# Patient Record
Sex: FEMALE | Race: ASIAN | NOT HISPANIC OR LATINO | Employment: UNEMPLOYED | URBAN - METROPOLITAN AREA
[De-identification: names, ages, dates, MRNs, and addresses within clinical notes are randomized per-mention and may not be internally consistent; named-entity substitution may affect disease eponyms.]

---

## 2021-04-19 ENCOUNTER — OFFICE VISIT - HEALTHEAST (OUTPATIENT)
Dept: FAMILY MEDICINE | Facility: CLINIC | Age: 61
End: 2021-04-19

## 2021-04-19 DIAGNOSIS — E78.00 HYPERCHOLESTEREMIA: ICD-10-CM

## 2021-04-19 DIAGNOSIS — R03.0 ELEVATED BLOOD PRESSURE READING WITHOUT DIAGNOSIS OF HYPERTENSION: ICD-10-CM

## 2021-04-19 DIAGNOSIS — G44.209 TENSION HEADACHE: ICD-10-CM

## 2021-04-19 RX ORDER — ACETAMINOPHEN 500 MG
1000 TABLET ORAL 3 TIMES DAILY PRN
Qty: 100 TABLET | Refills: 0 | Status: SHIPPED | OUTPATIENT
Start: 2021-04-19

## 2021-06-05 VITALS
SYSTOLIC BLOOD PRESSURE: 158 MMHG | TEMPERATURE: 98 F | RESPIRATION RATE: 16 BRPM | OXYGEN SATURATION: 97 % | WEIGHT: 149.25 LBS | HEART RATE: 73 BPM | DIASTOLIC BLOOD PRESSURE: 84 MMHG

## 2021-06-16 PROBLEM — E78.00 HYPERCHOLESTEREMIA: Status: ACTIVE | Noted: 2021-04-19

## 2021-06-16 NOTE — PATIENT INSTRUCTIONS - HE
Tylenol as needed for pain up to 3 times daily.      Ask at [facility] for info Hmong speaking doctor.  Otherwise, make appointment at  for recheck and new provider on Friday.

## 2021-06-16 NOTE — PROGRESS NOTES
Chief Complaint   Patient presents with     Headache     x3d, throbbing HA, sweating a lot       ASSESSMENT & PLAN:   Diagnoses and all orders for this visit:    Tension headache  -     acetaminophen (TYLENOL EXTRA STRENGTH) 500 MG tablet; Take 2 tablets (1,000 mg total) by mouth 3 (three) times a day as needed for pain.  Dispense: 100 tablet; Refill: 0    Hypercholesteremia    Elevated blood pressure reading without diagnosis of hypertension      Patient with improving left-sided headache.  No red flag symptoms such as neuro deficits, vomiting, markedly elevated blood pressure, weakness, numbness or tingling, thunderclap headache.  Is associated upon exam with left-sided trapezius muscle tenderness.    Tylenol, discussed proper posture and range of motion exercises for arms.    Referred to primary care for Cholesterol labs and re-assess high blood pressure as well as recheck of the headache.      Discussed red flag signs with patient and daughter-in-law.    Supportive care discussed.  See discharge instructions below for specific recommendations given.    At the end of the encounter, I discussed results, diagnosis, medications with the patient and/or caregivers. Discussed red flags for immediate return to clinic/ER, as well as indications for follow up if no improvement. Patient and/or caregiver understood and agreed to plan. Patient was stable for discharge.    25 minutes spent on the date of the encounter doing chart review, review of outside records, patient visit and documentation       SUBJECTIVE    HPI:    JOSEPH Chapin David presents to the walk-in clinic with   Chief Complaint   Patient presents with     Headache     x3d, throbbing HA, sweating a lot       -60 year old Hmong female states that she has had a localized headache on the left side of her head for the last 3 days.  Describes it as a throbbing intermittent pain currently 2-3/10 pain at its worst and decreases down to a 2/10.   Nothing seems to help the  "pain, she has tried Tylenol, but it did not change the pain. States she does use reading glasses but has for years.    Left upper back is tense and tender.  No specific injury.  Does not work.  Stays at home.      Denies any head trauma, seasonal allergies, or any associated symptoms such as dizziness. Denies having this pain before.  Denies migraine hx, fever, sore throat or sinus drainage/pain or exposure to COVID. Denies history of stroke or high blood pressure.     Patient also wondering about her excessive sweating. States that she has to take 2 showers/day and this has been going on since she was 55 years old.     Denies any past medical history other then high cholesterol.  She used to take medication but has stopped it due to insurance coverage.     Pt has not seen a doctor in over 3 years. Her PCP moved to another clinic and she has not been seen for 3 years.      Known exposures: None    See ROS for additional symptoms and/or pertinent negatives.     Due to language barrier, an  was present during the history-taking and subsequent discussion (and for part of the physical exam) with this patient.    History obtained from the patient, daughter in law.      Review of Systems   Constitutional: Negative for chills and fever.   HENT: Negative for congestion, ear pain, sinus pressure, sinus pain, sneezing and sore throat.    Eyes: Positive for visual disturbance (blurry \"normal for my age\" - passed license ).   Gastrointestinal: Negative for vomiting.   Musculoskeletal: Negative for neck pain.       OBJECTIVE    Vitals:    04/19/21 1134   BP: 158/84   Patient Site: Right Arm   Patient Position: Sitting   Cuff Size: Adult Regular   Pulse: 73   Resp: 16   Temp: 98  F (36.7  C)   TempSrc: Oral   SpO2: 97%   Weight: 149 lb 4 oz (67.7 kg)       Physical Exam  Constitutional:       General: She is not in acute distress.     Appearance: She is well-developed.   HENT:      Right Ear: External ear normal.      " Left Ear: External ear normal.      Nose: No congestion or rhinorrhea.   Eyes:      General:         Right eye: No discharge.         Left eye: No discharge.      Conjunctiva/sclera: Conjunctivae normal.      Pupils: Pupils are equal, round, and reactive to light.   Neck:      Musculoskeletal: Normal range of motion. Muscular tenderness present.   Pulmonary:      Effort: Pulmonary effort is normal.   Musculoskeletal: Normal range of motion.         General: Tenderness (left upper trapezius and left para-cervical muscles ) present.   Lymphadenopathy:      Cervical: No cervical adenopathy.   Skin:     General: Skin is warm and dry.      Capillary Refill: Capillary refill takes less than 2 seconds.   Neurological:      General: No focal deficit present.      Mental Status: She is alert and oriented to person, place, and time.      Motor: No weakness (UE ).      Coordination: Coordination normal.      Gait: Gait normal.   Psychiatric:         Mood and Affect: Mood normal.         Behavior: Behavior normal.         Thought Content: Thought content normal.         Judgment: Judgment normal.         Labs/EKG:  No results found for this or any previous visit.        Radiology:      PATIENT INSTRUCTIONS:   Patient Instructions   Tylenol as needed for pain up to 3 times daily.      Ask at [facility] for info Hmong speaking doctor.  Otherwise, make appointment at  for recheck and new provider on Friday.

## 2023-08-31 ENCOUNTER — ANESTHESIA EVENT (OUTPATIENT)
Dept: SURGERY | Facility: HOSPITAL | Age: 63
DRG: 157 | End: 2023-08-31
Payer: MEDICARE

## 2023-08-31 ENCOUNTER — ANESTHESIA (OUTPATIENT)
Dept: SURGERY | Facility: HOSPITAL | Age: 63
DRG: 157 | End: 2023-08-31
Payer: MEDICARE

## 2023-08-31 ENCOUNTER — APPOINTMENT (OUTPATIENT)
Dept: RADIOLOGY | Facility: HOSPITAL | Age: 63
DRG: 157 | End: 2023-08-31
Attending: INTERNAL MEDICINE
Payer: MEDICARE

## 2023-08-31 ENCOUNTER — APPOINTMENT (OUTPATIENT)
Dept: GENERAL RADIOLOGY | Facility: CLINIC | Age: 63
DRG: 137 | End: 2023-08-31
Attending: SURGERY
Payer: MEDICARE

## 2023-08-31 ENCOUNTER — APPOINTMENT (OUTPATIENT)
Dept: CT IMAGING | Facility: HOSPITAL | Age: 63
DRG: 157 | End: 2023-08-31
Attending: STUDENT IN AN ORGANIZED HEALTH CARE EDUCATION/TRAINING PROGRAM
Payer: MEDICARE

## 2023-08-31 ENCOUNTER — HOSPITAL ENCOUNTER (INPATIENT)
Facility: CLINIC | Age: 63
LOS: 6 days | Discharge: HOME | DRG: 137 | End: 2023-09-06
Attending: SURGERY | Admitting: SURGERY
Payer: MEDICARE

## 2023-08-31 ENCOUNTER — HOSPITAL ENCOUNTER (INPATIENT)
Facility: HOSPITAL | Age: 63
LOS: 1 days | Discharge: ANOTHER HOSPITAL | DRG: 157 | End: 2023-08-31
Attending: STUDENT IN AN ORGANIZED HEALTH CARE EDUCATION/TRAINING PROGRAM | Admitting: INTERNAL MEDICINE
Payer: MEDICARE

## 2023-08-31 VITALS
BODY MASS INDEX: 28.4 KG/M2 | OXYGEN SATURATION: 97 % | HEART RATE: 79 BPM | RESPIRATION RATE: 14 BRPM | DIASTOLIC BLOOD PRESSURE: 57 MMHG | WEIGHT: 154.32 LBS | HEIGHT: 62 IN | TEMPERATURE: 100 F | SYSTOLIC BLOOD PRESSURE: 96 MMHG

## 2023-08-31 DIAGNOSIS — L02.11 ABSCESS OF NECK: Primary | ICD-10-CM

## 2023-08-31 DIAGNOSIS — R22.0 JAW SWELLING: ICD-10-CM

## 2023-08-31 DIAGNOSIS — K12.2 LUDWIG'S ANGINA: ICD-10-CM

## 2023-08-31 LAB
ALBUMIN SERPL BCG-MCNC: 4 G/DL (ref 3.5–5.2)
ALP SERPL-CCNC: 99 U/L (ref 35–104)
ALT SERPL W P-5'-P-CCNC: 49 U/L (ref 0–50)
ANION GAP SERPL CALCULATED.3IONS-SCNC: 13 MMOL/L (ref 7–15)
AST SERPL W P-5'-P-CCNC: 28 U/L (ref 0–45)
BASE EXCESS BLDA CALC-SCNC: 0.9 MMOL/L
BASOPHILS # BLD AUTO: 0.1 10E3/UL (ref 0–0.2)
BASOPHILS NFR BLD AUTO: 1 %
BILIRUB SERPL-MCNC: 1.7 MG/DL
BUN SERPL-MCNC: 10.5 MG/DL (ref 8–23)
CALCIUM SERPL-MCNC: 9.6 MG/DL (ref 8.8–10.2)
CHLORIDE SERPL-SCNC: 103 MMOL/L (ref 98–107)
COHGB MFR BLD: 99.8 % (ref 96–97)
CREAT BLD-MCNC: 0.6 MG/DL (ref 0.6–1.1)
CREAT SERPL-MCNC: 0.7 MG/DL (ref 0.51–0.95)
DEPRECATED HCO3 PLAS-SCNC: 26 MMOL/L (ref 22–29)
EOSINOPHIL # BLD AUTO: 0 10E3/UL (ref 0–0.7)
EOSINOPHIL NFR BLD AUTO: 0 %
ERYTHROCYTE [DISTWIDTH] IN BLOOD BY AUTOMATED COUNT: 15 % (ref 10–15)
GFR SERPL CREATININE-BSD FRML MDRD: >60 ML/MIN/1.73M2
GFR SERPL CREATININE-BSD FRML MDRD: >90 ML/MIN/1.73M2
GLUCOSE BLDC GLUCOMTR-MCNC: 107 MG/DL (ref 70–99)
GLUCOSE BLDC GLUCOMTR-MCNC: 136 MG/DL (ref 70–99)
GLUCOSE SERPL-MCNC: 123 MG/DL (ref 70–99)
HCO3 BLD-SCNC: 25 MMOL/L (ref 23–29)
HCT VFR BLD AUTO: 48.9 % (ref 35–47)
HGB BLD-MCNC: 16.6 G/DL (ref 11.7–15.7)
IMM GRANULOCYTES # BLD: 0.1 10E3/UL
IMM GRANULOCYTES NFR BLD: 1 %
LACTATE SERPL-SCNC: 1.2 MMOL/L (ref 0.7–2)
LYMPHOCYTES # BLD AUTO: 0.9 10E3/UL (ref 0.8–5.3)
LYMPHOCYTES NFR BLD AUTO: 5 %
MCH RBC QN AUTO: 31.7 PG (ref 26.5–33)
MCHC RBC AUTO-ENTMCNC: 33.9 G/DL (ref 31.5–36.5)
MCV RBC AUTO: 94 FL (ref 78–100)
MONOCYTES # BLD AUTO: 0.9 10E3/UL (ref 0–1.3)
MONOCYTES NFR BLD AUTO: 5 %
NEUTROPHILS # BLD AUTO: 15.5 10E3/UL (ref 1.6–8.3)
NEUTROPHILS NFR BLD AUTO: 88 %
NRBC # BLD AUTO: 0 10E3/UL
NRBC BLD AUTO-RTO: 0 /100
OXYHGB MFR BLD: >98.5 % (ref 96–97)
PCO2 BLD: 35 MM HG (ref 35–45)
PH BLD: 7.46 [PH] (ref 7.37–7.44)
PLATELET # BLD AUTO: 176 10E3/UL (ref 150–450)
PO2 BLD: 125 MM HG (ref 80–90)
POTASSIUM SERPL-SCNC: 4 MMOL/L (ref 3.4–5.3)
PROT SERPL-MCNC: 7.1 G/DL (ref 6.4–8.3)
RBC # BLD AUTO: 5.23 10E6/UL (ref 3.8–5.2)
SODIUM SERPL-SCNC: 142 MMOL/L (ref 136–145)
TEMPERATURE: 37 DEGREES C
WBC # BLD AUTO: 17.4 10E3/UL (ref 4–11)

## 2023-08-31 PROCEDURE — 0BH17EZ INSERTION OF ENDOTRACHEAL AIRWAY INTO TRACHEA, VIA NATURAL OR ARTIFICIAL OPENING: ICD-10-PCS | Performed by: OTOLARYNGOLOGY

## 2023-08-31 PROCEDURE — 250N000011 HC RX IP 250 OP 636: Mod: JZ | Performed by: NURSE ANESTHETIST, CERTIFIED REGISTERED

## 2023-08-31 PROCEDURE — 87040 BLOOD CULTURE FOR BACTERIA: CPT | Performed by: STUDENT IN AN ORGANIZED HEALTH CARE EDUCATION/TRAINING PROGRAM

## 2023-08-31 PROCEDURE — 200N000002 HC R&B ICU UMMC

## 2023-08-31 PROCEDURE — 74018 RADEX ABDOMEN 1 VIEW: CPT

## 2023-08-31 PROCEDURE — 96375 TX/PRO/DX INJ NEW DRUG ADDON: CPT

## 2023-08-31 PROCEDURE — 250N000011 HC RX IP 250 OP 636: Performed by: NURSE ANESTHETIST, CERTIFIED REGISTERED

## 2023-08-31 PROCEDURE — 250N000013 HC RX MED GY IP 250 OP 250 PS 637: Performed by: INTERNAL MEDICINE

## 2023-08-31 PROCEDURE — 250N000013 HC RX MED GY IP 250 OP 250 PS 637

## 2023-08-31 PROCEDURE — 83605 ASSAY OF LACTIC ACID: CPT | Performed by: STUDENT IN AN ORGANIZED HEALTH CARE EDUCATION/TRAINING PROGRAM

## 2023-08-31 PROCEDURE — 82565 ASSAY OF CREATININE: CPT

## 2023-08-31 PROCEDURE — 250N000011 HC RX IP 250 OP 636: Performed by: INTERNAL MEDICINE

## 2023-08-31 PROCEDURE — G1010 CDSM STANSON: HCPCS

## 2023-08-31 PROCEDURE — 360N000076 HC SURGERY LEVEL 3, PER MIN: Performed by: OTOLARYNGOLOGY

## 2023-08-31 PROCEDURE — 250N000011 HC RX IP 250 OP 636

## 2023-08-31 PROCEDURE — 258N000003 HC RX IP 258 OP 636: Performed by: NURSE ANESTHETIST, CERTIFIED REGISTERED

## 2023-08-31 PROCEDURE — 99291 CRITICAL CARE FIRST HOUR: CPT | Performed by: INTERNAL MEDICINE

## 2023-08-31 PROCEDURE — 999N000157 HC STATISTIC RCP TIME EA 10 MIN

## 2023-08-31 PROCEDURE — 82805 BLOOD GASES W/O2 SATURATION: CPT | Performed by: INTERNAL MEDICINE

## 2023-08-31 PROCEDURE — 71045 X-RAY EXAM CHEST 1 VIEW: CPT

## 2023-08-31 PROCEDURE — 74018 RADEX ABDOMEN 1 VIEW: CPT | Mod: 26 | Performed by: RADIOLOGY

## 2023-08-31 PROCEDURE — 99291 CRITICAL CARE FIRST HOUR: CPT | Mod: 25

## 2023-08-31 PROCEDURE — 999N000065 XR CHEST PORT 1 VIEW

## 2023-08-31 PROCEDURE — 250N000011 HC RX IP 250 OP 636: Mod: JZ | Performed by: STUDENT IN AN ORGANIZED HEALTH CARE EDUCATION/TRAINING PROGRAM

## 2023-08-31 PROCEDURE — 0CJS8ZZ INSPECTION OF LARYNX, VIA NATURAL OR ARTIFICIAL OPENING ENDOSCOPIC: ICD-10-PCS | Performed by: OTOLARYNGOLOGY

## 2023-08-31 PROCEDURE — 96365 THER/PROPH/DIAG IV INF INIT: CPT

## 2023-08-31 PROCEDURE — 200N000001 HC R&B ICU

## 2023-08-31 PROCEDURE — 370N000017 HC ANESTHESIA TECHNICAL FEE, PER MIN: Performed by: OTOLARYNGOLOGY

## 2023-08-31 PROCEDURE — 71045 X-RAY EXAM CHEST 1 VIEW: CPT | Mod: 26 | Performed by: RADIOLOGY

## 2023-08-31 PROCEDURE — 5A1935Z RESPIRATORY VENTILATION, LESS THAN 24 CONSECUTIVE HOURS: ICD-10-PCS | Performed by: OTOLARYNGOLOGY

## 2023-08-31 PROCEDURE — 94002 VENT MGMT INPAT INIT DAY: CPT

## 2023-08-31 PROCEDURE — 99207 PR APP CREDIT; MD BILLING SHARED VISIT: CPT | Performed by: INTERNAL MEDICINE

## 2023-08-31 PROCEDURE — 85025 COMPLETE CBC W/AUTO DIFF WBC: CPT | Performed by: STUDENT IN AN ORGANIZED HEALTH CARE EDUCATION/TRAINING PROGRAM

## 2023-08-31 PROCEDURE — 31500 INSERT EMERGENCY AIRWAY: CPT | Performed by: OTOLARYNGOLOGY

## 2023-08-31 PROCEDURE — 36415 COLL VENOUS BLD VENIPUNCTURE: CPT | Performed by: STUDENT IN AN ORGANIZED HEALTH CARE EDUCATION/TRAINING PROGRAM

## 2023-08-31 PROCEDURE — 250N000025 HC SEVOFLURANE, PER MIN: Performed by: OTOLARYNGOLOGY

## 2023-08-31 PROCEDURE — 80053 COMPREHEN METABOLIC PANEL: CPT | Performed by: STUDENT IN AN ORGANIZED HEALTH CARE EDUCATION/TRAINING PROGRAM

## 2023-08-31 PROCEDURE — 999N000065 XR ABDOMEN PORT 1 VIEW

## 2023-08-31 PROCEDURE — 250N000009 HC RX 250: Performed by: STUDENT IN AN ORGANIZED HEALTH CARE EDUCATION/TRAINING PROGRAM

## 2023-08-31 RX ORDER — CHLORHEXIDINE GLUCONATE ORAL RINSE 1.2 MG/ML
15 SOLUTION DENTAL EVERY 12 HOURS
Status: DISCONTINUED | OUTPATIENT
Start: 2023-08-31 | End: 2023-08-31 | Stop reason: HOSPADM

## 2023-08-31 RX ORDER — HYDROMORPHONE HYDROCHLORIDE 1 MG/ML
.3-.5 INJECTION, SOLUTION INTRAMUSCULAR; INTRAVENOUS; SUBCUTANEOUS
Status: CANCELLED | OUTPATIENT
Start: 2023-08-31

## 2023-08-31 RX ORDER — ACETAMINOPHEN 325 MG/1
975 TABLET ORAL EVERY 8 HOURS
Status: DISCONTINUED | OUTPATIENT
Start: 2023-08-31 | End: 2023-09-02

## 2023-08-31 RX ORDER — NALOXONE HYDROCHLORIDE 0.4 MG/ML
0.2 INJECTION, SOLUTION INTRAMUSCULAR; INTRAVENOUS; SUBCUTANEOUS
Status: DISCONTINUED | OUTPATIENT
Start: 2023-08-31 | End: 2023-08-31 | Stop reason: HOSPADM

## 2023-08-31 RX ORDER — PROPOFOL 10 MG/ML
INJECTION, EMULSION INTRAVENOUS
Status: COMPLETED
Start: 2023-08-31 | End: 2023-08-31

## 2023-08-31 RX ORDER — DEXTROSE MONOHYDRATE 25 G/50ML
25-50 INJECTION, SOLUTION INTRAVENOUS
Status: DISCONTINUED | OUTPATIENT
Start: 2023-08-31 | End: 2023-08-31

## 2023-08-31 RX ORDER — PROPOFOL 10 MG/ML
5-75 INJECTION, EMULSION INTRAVENOUS CONTINUOUS
Status: DISCONTINUED | OUTPATIENT
Start: 2023-08-31 | End: 2023-09-03

## 2023-08-31 RX ORDER — NALOXONE HYDROCHLORIDE 0.4 MG/ML
0.2 INJECTION, SOLUTION INTRAMUSCULAR; INTRAVENOUS; SUBCUTANEOUS
Status: CANCELLED | OUTPATIENT
Start: 2023-08-31

## 2023-08-31 RX ORDER — AMPICILLIN AND SULBACTAM 2; 1 G/1; G/1
3 INJECTION, POWDER, FOR SOLUTION INTRAMUSCULAR; INTRAVENOUS EVERY 6 HOURS
Status: CANCELLED | OUTPATIENT
Start: 2023-08-31

## 2023-08-31 RX ORDER — NALOXONE HYDROCHLORIDE 0.4 MG/ML
0.4 INJECTION, SOLUTION INTRAMUSCULAR; INTRAVENOUS; SUBCUTANEOUS
Status: DISCONTINUED | OUTPATIENT
Start: 2023-08-31 | End: 2023-09-06 | Stop reason: HOSPADM

## 2023-08-31 RX ORDER — NALOXONE HYDROCHLORIDE 0.4 MG/ML
0.4 INJECTION, SOLUTION INTRAMUSCULAR; INTRAVENOUS; SUBCUTANEOUS
Status: DISCONTINUED | OUTPATIENT
Start: 2023-08-31 | End: 2023-08-31 | Stop reason: HOSPADM

## 2023-08-31 RX ORDER — DEXTROSE MONOHYDRATE 25 G/50ML
25-50 INJECTION, SOLUTION INTRAVENOUS
Status: DISCONTINUED | OUTPATIENT
Start: 2023-08-31 | End: 2023-09-06 | Stop reason: HOSPADM

## 2023-08-31 RX ORDER — NALOXONE HYDROCHLORIDE 0.4 MG/ML
0.2 INJECTION, SOLUTION INTRAMUSCULAR; INTRAVENOUS; SUBCUTANEOUS
Status: DISCONTINUED | OUTPATIENT
Start: 2023-08-31 | End: 2023-09-06 | Stop reason: HOSPADM

## 2023-08-31 RX ORDER — CHLORHEXIDINE GLUCONATE ORAL RINSE 1.2 MG/ML
15 SOLUTION DENTAL EVERY 12 HOURS
Status: DISCONTINUED | OUTPATIENT
Start: 2023-09-01 | End: 2023-09-03

## 2023-08-31 RX ORDER — NALOXONE HYDROCHLORIDE 0.4 MG/ML
0.4 INJECTION, SOLUTION INTRAMUSCULAR; INTRAVENOUS; SUBCUTANEOUS
Status: CANCELLED | OUTPATIENT
Start: 2023-08-31

## 2023-08-31 RX ORDER — AMPICILLIN AND SULBACTAM 2; 1 G/1; G/1
3 INJECTION, POWDER, FOR SOLUTION INTRAMUSCULAR; INTRAVENOUS EVERY 6 HOURS
Status: DISCONTINUED | OUTPATIENT
Start: 2023-09-01 | End: 2023-09-06 | Stop reason: HOSPADM

## 2023-08-31 RX ORDER — DEXTROSE MONOHYDRATE 25 G/50ML
25-50 INJECTION, SOLUTION INTRAVENOUS
Status: DISCONTINUED | OUTPATIENT
Start: 2023-08-31 | End: 2023-08-31 | Stop reason: HOSPADM

## 2023-08-31 RX ORDER — DEXAMETHASONE SODIUM PHOSPHATE 10 MG/ML
10 INJECTION, SOLUTION INTRAMUSCULAR; INTRAVENOUS ONCE
Status: COMPLETED | OUTPATIENT
Start: 2023-08-31 | End: 2023-08-31

## 2023-08-31 RX ORDER — AMPICILLIN AND SULBACTAM 2; 1 G/1; G/1
3 INJECTION, POWDER, FOR SOLUTION INTRAMUSCULAR; INTRAVENOUS ONCE
Status: COMPLETED | OUTPATIENT
Start: 2023-08-31 | End: 2023-08-31

## 2023-08-31 RX ORDER — SODIUM CHLORIDE 9 MG/ML
INJECTION, SOLUTION INTRAVENOUS CONTINUOUS PRN
Status: DISCONTINUED | OUTPATIENT
Start: 2023-08-31 | End: 2023-08-31

## 2023-08-31 RX ORDER — AMPICILLIN AND SULBACTAM 2; 1 G/1; G/1
3 INJECTION, POWDER, FOR SOLUTION INTRAMUSCULAR; INTRAVENOUS EVERY 6 HOURS
Status: DISCONTINUED | OUTPATIENT
Start: 2023-08-31 | End: 2023-08-31 | Stop reason: HOSPADM

## 2023-08-31 RX ORDER — NICOTINE POLACRILEX 4 MG
15-30 LOZENGE BUCCAL
Status: DISCONTINUED | OUTPATIENT
Start: 2023-08-31 | End: 2023-09-06 | Stop reason: HOSPADM

## 2023-08-31 RX ORDER — NICOTINE POLACRILEX 4 MG
15-30 LOZENGE BUCCAL
Status: CANCELLED | OUTPATIENT
Start: 2023-08-31

## 2023-08-31 RX ORDER — PROPOFOL 10 MG/ML
5-75 INJECTION, EMULSION INTRAVENOUS CONTINUOUS
Status: DISCONTINUED | OUTPATIENT
Start: 2023-08-31 | End: 2023-08-31 | Stop reason: HOSPADM

## 2023-08-31 RX ORDER — IOPAMIDOL 755 MG/ML
90 INJECTION, SOLUTION INTRAVASCULAR ONCE
Status: COMPLETED | OUTPATIENT
Start: 2023-08-31 | End: 2023-08-31

## 2023-08-31 RX ORDER — PROPOFOL 10 MG/ML
INJECTION, EMULSION INTRAVENOUS PRN
Status: DISCONTINUED | OUTPATIENT
Start: 2023-08-31 | End: 2023-08-31

## 2023-08-31 RX ORDER — PROPOFOL 10 MG/ML
5-75 INJECTION, EMULSION INTRAVENOUS CONTINUOUS
Status: CANCELLED | OUTPATIENT
Start: 2023-08-31

## 2023-08-31 RX ORDER — HYDROMORPHONE HYDROCHLORIDE 1 MG/ML
.3-.5 INJECTION, SOLUTION INTRAMUSCULAR; INTRAVENOUS; SUBCUTANEOUS
Status: DISCONTINUED | OUTPATIENT
Start: 2023-08-31 | End: 2023-08-31 | Stop reason: HOSPADM

## 2023-08-31 RX ORDER — CHLORHEXIDINE GLUCONATE ORAL RINSE 1.2 MG/ML
15 SOLUTION DENTAL EVERY 12 HOURS
Status: CANCELLED | OUTPATIENT
Start: 2023-09-01

## 2023-08-31 RX ORDER — NICOTINE POLACRILEX 4 MG
15-30 LOZENGE BUCCAL
Status: DISCONTINUED | OUTPATIENT
Start: 2023-08-31 | End: 2023-08-31

## 2023-08-31 RX ORDER — NICOTINE POLACRILEX 4 MG
15-30 LOZENGE BUCCAL
Status: DISCONTINUED | OUTPATIENT
Start: 2023-08-31 | End: 2023-08-31 | Stop reason: HOSPADM

## 2023-08-31 RX ORDER — HYDROMORPHONE HYDROCHLORIDE 1 MG/ML
.3-.5 INJECTION, SOLUTION INTRAMUSCULAR; INTRAVENOUS; SUBCUTANEOUS
Status: DISCONTINUED | OUTPATIENT
Start: 2023-08-31 | End: 2023-09-03

## 2023-08-31 RX ORDER — LIDOCAINE HYDROCHLORIDE 20 MG/ML
10 JELLY TOPICAL ONCE
Status: COMPLETED | OUTPATIENT
Start: 2023-08-31 | End: 2023-08-31

## 2023-08-31 RX ORDER — DEXTROSE MONOHYDRATE 25 G/50ML
25-50 INJECTION, SOLUTION INTRAVENOUS
Status: CANCELLED | OUTPATIENT
Start: 2023-08-31

## 2023-08-31 RX ORDER — DEXTROSE MONOHYDRATE, SODIUM CHLORIDE, AND POTASSIUM CHLORIDE 50; 1.49; 4.5 G/1000ML; G/1000ML; G/1000ML
INJECTION, SOLUTION INTRAVENOUS CONTINUOUS
Status: DISCONTINUED | OUTPATIENT
Start: 2023-08-31 | End: 2023-09-01

## 2023-08-31 RX ADMIN — PHENYLEPHRINE HYDROCHLORIDE 100 MCG: 10 INJECTION INTRAVENOUS at 17:50

## 2023-08-31 RX ADMIN — CHLORHEXIDINE GLUCONATE 15 ML: 1.2 SOLUTION ORAL at 19:58

## 2023-08-31 RX ADMIN — PROPOFOL 100 MG: 10 INJECTION, EMULSION INTRAVENOUS at 17:39

## 2023-08-31 RX ADMIN — PROPOFOL 70 MCG/KG/MIN: 10 INJECTION, EMULSION INTRAVENOUS at 18:28

## 2023-08-31 RX ADMIN — AMPICILLIN SODIUM AND SULBACTAM SODIUM 3 G: 2; 1 INJECTION, POWDER, FOR SOLUTION INTRAMUSCULAR; INTRAVENOUS at 16:34

## 2023-08-31 RX ADMIN — AMPICILLIN SODIUM AND SULBACTAM SODIUM 3 G: 2; 1 INJECTION, POWDER, FOR SOLUTION INTRAMUSCULAR; INTRAVENOUS at 21:33

## 2023-08-31 RX ADMIN — SODIUM CHLORIDE: 9 INJECTION, SOLUTION INTRAVENOUS at 17:33

## 2023-08-31 RX ADMIN — LIDOCAINE HYDROCHLORIDE 10 ML: 20 JELLY TOPICAL at 17:01

## 2023-08-31 RX ADMIN — IOPAMIDOL 90 ML: 755 INJECTION, SOLUTION INTRAVENOUS at 16:36

## 2023-08-31 RX ADMIN — DEXAMETHASONE SODIUM PHOSPHATE 10 MG: 10 INJECTION, SOLUTION INTRAMUSCULAR; INTRAVENOUS at 16:17

## 2023-08-31 RX ADMIN — PROPOFOL 70 MCG/KG/MIN: 10 INJECTION, EMULSION INTRAVENOUS at 21:37

## 2023-08-31 RX ADMIN — Medication 10 MG: at 20:34

## 2023-08-31 RX ADMIN — ACETAMINOPHEN 975 MG: 325 TABLET, FILM COATED ORAL at 23:59

## 2023-08-31 ASSESSMENT — ACTIVITIES OF DAILY LIVING (ADL)
ADLS_ACUITY_SCORE: 37
ADLS_ACUITY_SCORE: 35
ADLS_ACUITY_SCORE: 31

## 2023-08-31 NOTE — CONSULTS
Patient presented with mouth swelling, tongue pain, jaw swelling more so on the left.  Clinical picture consistent with Jarocho's angina.  Patient had leukocytosis and fever.  CT imaging of the neck with contrast shows odontogenic infection involving teeth numbers 22 and 23 with periapical lucency and floor of mouth surgically drainable fluid collection only in the sublingual space on the left extending into the submandibular space.  There is some reactive lymphadenopathy.    Patient was seen urgently at the bedside. The patient was seen, examined, and counseled today with the help of an interpretor.  Flexible fiberoptic laryngoscopy was performed through the right nasal cavity following application of 2% lidocaine jelly.  Nasal cavity was normal.  Nasopharynx was normal.  The base of tongue was normal and symmetric.  The epiglottis is crisp.  The patient had no significant supraglottic or glottic airway swelling.  The bilateral true vocal folds were symmetrically mobile without nodules or masses.  Infraglottic and subglottic areas were widely patent.  The scope was removed.  Patient tolerated procedure well.    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  We will need to secure her airway for transfer to an oral surgeon who can perform dental extraction and drainage of her odontogenic infection.    We discussed the risks, benefits, alternatives, options of intubation in the operating room with possible cricothyrotomy or tracheostomy including, but not, limited to: risk of bleeding, risk of infection, risk of post-operative pain, risk of pneumothorax, risk of false tracking, risk of loss of airway/death, risk of airway fire, potential need for additional procedures, risk of general anesthesia.  We discussed the postoperative course and convalescence including the long-term tracheostomy cares and need for decannulation once respiratory status improves.  Based on the  fiberoptic exam, I do think it is likely that we will be able to secure her airway without surgical airway.  Once her airway is secure, she will be transferred to the ICU and then to a tertiary care center.    Jose R Montgomery MD  Department of Otolaryngology-Head and Neck Surgery  [facility]

## 2023-08-31 NOTE — OP NOTE
PREOPERATIVE DIAGNOSES: Jarocho's angina, airway compromise.     POSTOPERATIVE DIAGNOSES: Same.      PROCEDURE PERFORMED:   1.  Intubation in the operating room     SURGEON: Jose R Montgomery MD, Artemio Singh MD     ASSISTANTS: None.     BLOOD LOSS: None.      COMPLICATIONS: None.      SPECIMENS: None.     ANESTHESIA: General.     GRAFTS, IMPLANTS, DRAINS: None.     INDICATIONS: Prevent complications, treat disease.     FINDINGS:   Significant floor mouth swelling, trouble handling secretions.  Normal supraglottic and glottic structures, grade 1 view with intubation.     OPERATIVE TECHNIQUE: The patient was brought to the operating room and identified by name clinic number.  They were placed at a 30 degree angle and inhalational anesthesia was induced by the anesthesia service.  The patient was Spontaneously breathing.  The glide scope was introduced by the anesthesia service and the supraglottic and glottic structures were visualized.  A 7.0 endotracheal tube was placed, end-tidal CO2 was confirmed.  The tube was secured at 22 cm at the lip.      This marked the end of the procedure.  The patient was then transferred to the ICU in stable condition.  There were no complications.  There was minimal blood loss.  All standard protocol and universal precautions were used throughout the procedure.     Jose R Montgomery MD  Department of Otolaryngology-Head and Neck Surgery  [facility]

## 2023-08-31 NOTE — MEDICATION SCRIBE - ADMISSION MEDICATION HISTORY
Medication Scribe Admission Medication History    Admission medication history is complete. The information provided in this note is only as accurate as the sources available at the time of the update.    Medication reconciliation/reorder completed by provider prior to medication history? No    Information Source(s): Family member via in-person    Pertinent Information: Patient went directly from ER-1 to OR. Spoke to family member who confirmed that the only medication the patient takes is Tylenol. Patient reported to have taken last dose of Tylenol today around noon.     Changes made to PTA medication list:  Added: None  Deleted: None  Changed: None    Medication Affordability:unable to assess       Allergies reviewed with patient and updates made in EHR: yes    Medication History Completed By: Hernán Graham 8/31/2023 5:42 PM    Prior to Admission medications    Medication Sig Last Dose Taking? Auth Provider Long Term End Date   acetaminophen (TYLENOL EXTRA STRENGTH) 500 MG tablet [ACETAMINOPHEN (TYLENOL EXTRA STRENGTH) 500 MG TABLET] Take 2 tablets (1,000 mg total) by mouth 3 (three) times a day as needed for pain. 8/31/2023 at 1200 Yes Bernice Gupta, CNP

## 2023-08-31 NOTE — ANESTHESIA PREPROCEDURE EVALUATION
Anesthesia Pre-Procedure Evaluation    Patient: Dari Hernandez   MRN: [id number] : 1960        Procedure : Procedure(s):  INTUBATION, POSSIBLE TRACHEOSTOMY          History reviewed. No pertinent past medical history.   History reviewed. No pertinent surgical history.   No Known Allergies   Social History     Tobacco Use    Smoking status: Never    Smokeless tobacco: Never   Substance Use Topics    Alcohol use: Not on file      Wt Readings from Last 1 Encounters:   23 70 kg (154 lb 5.2 oz)        Anesthesia Evaluation   Pt has not had prior anesthetic         ROS/MED HX  ENT/Pulmonary: Comment: Dental abscess causing mandibular airway swelling and impending airway collapse.  History of cleo's angina      Neurologic:  - neg neurologic ROS     Cardiovascular:     (+) Dyslipidemia - -   -  - -                                      METS/Exercise Tolerance: >4 METS    Hematologic:  - neg hematologic  ROS     Musculoskeletal:  - neg musculoskeletal ROS     GI/Hepatic:  - neg GI/hepatic ROS     Renal/Genitourinary:  - neg Renal ROS     Endo:     (+)               Obesity,       Psychiatric/Substance Use:  - neg psychiatric ROS     Infectious Disease:  - neg infectious disease ROS     Malignancy:  - neg malignancy ROS     Other:  - neg other ROS          Physical Exam    Airway  airway exam normal      Mallampati: IV   TM distance: > 3 FB   Neck ROM: limited   Mouth opening: < 3 cm    Respiratory Devices and Support     Face Mask      Dental  no notable dental history     (+) Multiple visibly decayed, broken teeth      Cardiovascular   cardiovascular exam normal          Pulmonary   pulmonary exam normal                OUTSIDE LABS:  CBC:   Lab Results   Component Value Date    WBC 17.4 (H) 2023    HGB 16.6 (H) 2023    HCT 48.9 (H) 2023     2023     BMP:   Lab Results   Component Value Date     2023    POTASSIUM 4.0 2023    CHLORIDE 103 2023    CO2 26  08/31/2023    BUN 10.5 08/31/2023    CR 0.6 08/31/2023    CR 0.70 08/31/2023     (H) 08/31/2023     COAGS: No results found for: PTT, INR, FIBR  POC: No results found for: BGM, HCG, HCGS  HEPATIC:   Lab Results   Component Value Date    ALBUMIN 4.0 08/31/2023    PROTTOTAL 7.1 08/31/2023    ALT 49 08/31/2023    AST 28 08/31/2023    ALKPHOS 99 08/31/2023    BILITOTAL 1.7 (H) 08/31/2023     OTHER:   Lab Results   Component Value Date    LACT 1.2 08/31/2023    VIC 9.6 08/31/2023       Anesthesia Plan    ASA Status:  5, emergent    NPO Status:  Will be NPO Appropriate at ... 8/31/2023 5:00 PM   Anesthesia Type: General.     - Airway: ETT   Induction: Inhalation.   Maintenance: TIVA.   Techniques and Equipment:     - Airway: Video-Laryngoscope       Consents    Anesthesia Plan(s) and associated risks, benefits, and realistic alternatives discussed. Questions answered and patient/representative(s) expressed understanding.     - Discussed:     - Discussed with:  Patient,       - Extended Intubation/Ventilatory Support Discussed: No.      - Patient is DNR/DNI Status: No     Use of blood products discussed: No .     Postoperative Care    Pain management: IV analgesics, Multi-modal analgesia.     - Plan for long acting post-op opioid use   PONV prophylaxis: Ondansetron (or other 5HT-3), Dexamethasone or Solumedrol, Droperidol or Haldol     Comments:    Other Comments: Mask induction.  ENT at bedside on induction for standby emergency trach.  Plan to keep patient intubated and sedated to ICU.            Artemio Singh MD

## 2023-08-31 NOTE — ANESTHESIA CARE TRANSFER NOTE
Patient: Dari Hernandez    Procedure: Procedure(s):  INTUBATION       Diagnosis: Swelling [R60.9]  Diagnosis Additional Information: No value filed.    Anesthesia Type:   General     Note:    Oropharynx: endotracheal tube in place and ventilatory support  Level of Consciousness: unresponsive      Independent Airway: airway patency not satisfactory and stable  Dentition: dentition unchanged  Vital Signs Stable: post-procedure vital signs reviewed and stable  Report to RN Given: handoff report given  Patient transferred to: ICU    ICU Handoff: Call for PAUSE to initiate/utilize ICU HANDOFF, Identified Patient, Identified Responsible Provider, Reviewed the Pertinent Medical History, Discussed Surgical Course, Reviewed Intra-OP Anesthesia Management and Issues during Anesthesia, Set Expectations for Post Procedure Period and Allowed Opportunity for Questions and Acknowledgement of Understanding      Vitals:  Vitals Value Taken Time   /77    Temp 99.9 F    Pulse 77    Resp 14    SpO2 100 %        Electronically Signed By: MART Pierce CRNA  August 31, 2023  6:14 PM

## 2023-08-31 NOTE — H&P
ICU H&P    Assessment and plan: 63-year-old woman with recent onset of dental infection, anterior Jarocho's angina, now intubated.    Neuro: Therapeutic sedation with propofol and fentanyl as needed.    Cardiovascular: Presently stable    Pulmonary: Respiratory failure secondary to airway compromise.  Moderate tidal volume mechanical ventilation.  She received Decadron for her airway.  Currently secured with a 7-0 endotracheal tube.  She will need ENT or oral surgery to evaluate and intervene as needed.  It has been suggested by my specialists here that she be transferred to the [facility].  I will hold off further Decadron dosing.    ID: Status post Unasyn.  This should continue.    GI: N.p.o.    Renal: Presently within reasonable limits    Endo: Monitor blood sugars.    This patient is critically ill at high risk of decompensation and death.  30 minutes critical care time thus far today including time at the bedside during intubation with ENT and anesthesia MD, personal discussion with emergency department MD, titrating mechanical ventilation.    Chief complaint: Shortness of breath    HPI: Recent dental infection with symptoms that began yesterday and now with progressive left jaw pain and swelling.  Progressive difficulty with swallowing and muffled voice.  After initial evaluation she had a CT scan as well as a fiberoptic evaluation of her airway.  She was intubated with a glide scope without event.  Swelling has completely spared her epiglottis and larynx.    Medications and allergies reviewed    /85   Pulse 119   Temp (!) 101.2  F (38.4  C) (Temporal)   Resp 18   Wt 70 kg (154 lb 5.2 oz)   SpO2 100%   Muffled voice, sitting up, breathing comfortably  Endotracheal tube in place after intubation  Submandibular area swollen  Neck supple  Chest clear after intubation  Regular rate and rhythm  Abdomen soft  Extremities warm    Labs reviewed including normal metabolic panel, elevated white count with  neutrophilia, blood cultures pending    CT soft tissue neck reviewed.  Concerning for Jarocho's angina and associated inflammation.

## 2023-08-31 NOTE — ANESTHESIA PROCEDURE NOTES
Airway       Patient location during procedure: OR       Procedure Start/Stop Times: 8/31/2023 5:43 PM  Staff -        Anesthesiologist:  Artemio Singh MD       CRNA: Mukund Fox APRN CRNA       Performed By: anesthesiologistIndications and Patient Condition       Indications for airway management: gonzalo-procedural       Induction type:inhalational       Mask difficulty assessment: 1 - vent by mask    Final Airway Details       Final airway type: endotracheal airway       Successful airway: Single subglottic suction  Endotracheal Airway Details        ETT size (mm): 7.0       Cuffed: yes       Successful intubation technique: video laryngoscopy       VL Blade Size: Glidescope 3       Grade View of Cords: 1       Adjucts: stylet       Position: Right       Measured from: lips       Secured at (cm): 21    Post intubation assessment        Number of attempts at approach: 1       Number of other approaches attempted: 0       Secured with: silk tape       Ease of procedure: easy       Dentition: Intact and Unchanged    Medication(s) Administered   Medication Administration Time: 8/31/2023 5:43 PM

## 2023-08-31 NOTE — ED PROVIDER NOTES
NAME: Dari Hernandez  AGE: 63 year old female  YOB: 1960  MRN: [id number]  EVALUATION DATE & TIME: 8/31/2023  3:36 PM    PCP: No primary care provider on file.  ED PROVIDER: Aniyah Hong MD.    Chief Complaint   Patient presents with    Dental Pain    Jaw Pain     FINAL IMPRESSION:  1. Jarocho's angina    2. Jaw swelling      MEDICAL DECISION MAKING:    3:47 PM I met with the patient, obtained history, performed an initial exam, and discussed options and plan for diagnostics and treatment here in the ED.   4:11 PM Reevaluated patient.  4:12 PM Spoke with ENT, Dr. Montgomery. Anesthesia was paged.  4:15 PM Reevaluated and updated patient with findings.  4:22 PM I went with the patient to CT.  4:44 PM Reevaluated and updated patient with findings.  4:52 PM Charge nurse updated me, patient must go to ICU after OR, cannot come back to ED  4:57 PM Reevaluated patient and spoke to ENT, Dr. Montgomery face to face. Dr. Montgomery is evaluating patient at bedside.  5:15 PM Spoke with Intensivist, Dr. Gutierrez.  5:17 PM Spoke with [facility].    63-year-old female presents with left jaw pain and swelling.  Patient reports pain to her left jaw/teeth yesterday that has progressed to swelling.  She does report some difficulty swallowing and is occasionally having to spit out her saliva.  She is able to speak but her voice is muffled.  Her vitals are notable for tachycardia and fever on arrival.  Dx: jarocho's, retropharyngeal abscess, PTA, epiploitis, other dental infection, angioedema among others. Based on exam I had high concern for jarocho's. She was brought to room 1 and anesthesia and ENT were called stat. Given IV decadron and Unasyn. She remained stable without respiratory distress or rapid progression of swelling and anesthesia and I felt she was stable to wait to go to the OR with ENT for airway management. CT neck was obtained (nursing and I monitored her at scanner) and showed findings suspicious for edema/abscess  within flour of mouth, see full read below. She was taken to the OR emergently with anesthesia and ENT. She was accepted for admission by the intensivist here who is aware she will likely need to transfer to [facility]/higher level care.    Medical Decision Making    History:  Supplemental history from: Documented in chart, if applicable  External Record(s) reviewed: Documented in chart, if applicable.    Work Up:  Chart documentation includes differential considered and any EKGs or imaging interpreted by provider.  In additional to work up documented, I considered the following work up: Documented in chart, if applicable.    External consultation:  Discussion of management with another provider: ENT, Hospitalist, Intensivist, Radiology (regarding imaging), and Other: Anesthesia    Complicating factors:  Care impacted by chronic illness: Hyperlipidemia  Care affected by social determinants of health: N/A    Disposition considerations: Admit.    Critical Care  Performed by: Aniyah Hong MD  Authorized by: Aniyah Hong MD     Total critical care time: 60 minutes  Critical care time was exclusive of separately billable procedures and treating other patients.  Critical care was necessary to treat or prevent imminent or life-threatening deterioration of the following conditions: cleo's angina requiring airway protection  Critical care was time spent personally by me on the following activities: development of treatment plan with patient or surrogate, discussions with consultants, examination of patient, evaluation of patient's response to treatment, obtaining history from patient or surrogate, ordering and performing treatments and interventions, ordering and review of laboratory studies, ordering and review of radiographic studies and re-evaluation of patient's condition, this excludes any separately billable procedures.      MEDICATIONS GIVEN IN THE EMERGENCY:  Medications   ampicillin-sulbactam (UNASYN) 3 g vial to  attach to  mL bag (0 g Intravenous Stopped 8/31/23 1655)   dexAMETHasone PF (DECADRON) injection 10 mg (10 mg Intravenous $Given 8/31/23 1617)   iopamidol (ISOVUE-370) solution 90 mL (90 mLs Intravenous $Given 8/31/23 1636)   lidocaine (XYLOCAINE) 2 % external gel 10 mL (10 mLs Urethral $Given 8/31/23 1701)       NEW PRESCRIPTIONS STARTED AT TODAY'S ER VISIT:  Current Discharge Medication List           =================================================================  HPI    Patient information was obtained from: patient  Use of : Yes (Phone) - Language Jacky Hernandez is a 63 year old female with a past medical history of HLD, who presents dental pain.    Patient reports persistent left sided dental pain for 3 days. Since last night (8/30/23), she started to develop left sided jaw and throat pain and swelling. She states that pain has progressively gotten worse since then. Due to the swelling and pain, she is unable to tolerate secretions and associates decrease in liquid intake, sore throat, and headache. She has been taking tylenol with no significant relief. She otherwise denies associating fever. There were no other concerns/complaints at this time.    REVIEW OF SYSTEMS   All systems reviewed, please see HPI for pertinent findings    PAST MEDICAL HISTORY:  History reviewed. No pertinent past medical history.    PAST SURGICAL HISTORY:  History reviewed. No pertinent surgical history.    CURRENT MEDICATIONS:    No current facility-administered medications for this encounter.    ALLERGIES:  No Known Allergies    FAMILY HISTORY:  History reviewed. No pertinent family history.    SOCIAL HISTORY:   Social History     Socioeconomic History    Marital status:    Tobacco Use    Smoking status: Never    Smokeless tobacco: Never       PHYSICAL EXAM:    Vitals: /85   Pulse 119   Temp (!) 101.2  F (38.4  C) (Temporal)   Resp 18   Wt 70 kg (154 lb 5.2 oz)   SpO2 100%    Constitutional:  Well developed, well nourished  HENT: Swelling to the neck, occasionally spitting into tissue. Tongue is elevated and floor of the mouth is firm. Cannot fully visualize the posterior oropharynx. Has several missing teeth/poor dentition throughout  Eyes: Pupils mid-range, sclera white  Respiratory: CTAB, no respiratory distress, speaks full sentences easily.  Cardiovascular: Tachycardic heart rate, regular rhythm  Musculoskeletal: Moving all 4 extremities intentionally and without pain. No obvious deformity.  Skin: Warm, dry  Neurologic: Alert & oriented, speech clear, face symmetric, moving all extremities    LAB:  All pertinent labs reviewed and interpreted.  Labs Ordered and Resulted from Time of ED Arrival to Time of ED Departure   COMPREHENSIVE METABOLIC PANEL - Abnormal       Result Value    Sodium 142      Potassium 4.0      Chloride 103      Carbon Dioxide (CO2) 26      Anion Gap 13      Urea Nitrogen 10.5      Creatinine 0.70      Calcium 9.6      Glucose 123 (*)     Alkaline Phosphatase 99      AST 28      ALT 49      Protein Total 7.1      Albumin 4.0      Bilirubin Total 1.7 (*)     GFR Estimate >90     CBC WITH PLATELETS AND DIFFERENTIAL - Abnormal    WBC Count 17.4 (*)     RBC Count 5.23 (*)     Hemoglobin 16.6 (*)     Hematocrit 48.9 (*)     MCV 94      MCH 31.7      MCHC 33.9      RDW 15.0      Platelet Count 176      % Neutrophils 88      % Lymphocytes 5      % Monocytes 5      % Eosinophils 0      % Basophils 1      % Immature Granulocytes 1      NRBCs per 100 WBC 0      Absolute Neutrophils 15.5 (*)     Absolute Lymphocytes 0.9      Absolute Monocytes 0.9      Absolute Eosinophils 0.0      Absolute Basophils 0.1      Absolute Immature Granulocytes 0.1      Absolute NRBCs 0.0     LACTIC ACID WHOLE BLOOD - Normal    Lactic Acid 1.2     ISTAT CREATININE POCT - Normal    Creatinine POCT 0.6      GFR, ESTIMATED POCT >60     ISTAT CREATININE POCT   BLOOD CULTURE   BLOOD CULTURE       RADIOLOGY:  Soft  tissue neck CT w contrast   Final Result   IMPRESSION:   1.  Findings suspicious for confluent edema/abscess within the left floor of mouth versus superinfected ranula, which extends into the submandibular space via mylohyoid boutonniere deformity. Recommend ENT consultation and clinical correlation for    concerns of Jarocho angina.   2.  Primary versus secondary inflammatory changes within the pharynx and supraglottic larynx indicating pharyngitis and supraglottitis with reactive left parapharyngeal and lateral retropharyngeal edema.   3.  Favor secondarily inflamed left sublingual and submandibular glands with surrounding soft tissue edema extending inferiorly in the neck, as detailed.    4.  Dental disease as detailed. Note absence of left mandibular molar and premolar teeth.      David Spears MD notified provider, SHAYY HONG, of results via telephone at 8/31/2023 5:22 PM CDT, who acknowledge understanding.          EKG:   N/A    PROCEDURES:   Procedures       I, Ada Camarillo, am serving as a scribe to document services personally performed by Dr. Shayy Hong based on my observation and the provider's statements to me. I, Shayy Hong MD attest that Ada Camarillo is acting in a scribe capacity, has observed my performance of the services and has documented them in accordance with my direction.    Shayy Hong M.D.  Emergency Medicine  [facility] EMERGENCY DEPARTMENT  [address] [place] [id number]  [phone]  Dept: [phone]     Shayy Hong MD  08/31/23 9599

## 2023-08-31 NOTE — ANESTHESIA POSTPROCEDURE EVALUATION
Patient: Dari Hernandez    Procedure: Procedure(s):  INTUBATION       Anesthesia Type:  General    Note:  Disposition: Admission; ICU            ICU Sign Out: Anesthesiologist/ICU physician sign out WAS performed   Postop Pain Control: Uneventful            Sign Out: Well controlled pain   PONV: No   Neuro/Psych: Uneventful            Sign Out: PLANNED postop sedation   Airway/Respiratory: Uneventful            Sign Out: AIRWAY IN SITU/Resp. Support               Airway in situ/Resp. Support: ETT                 Reason: Planned Pre-op   CV/Hemodynamics: Uneventful            Sign Out: Acceptable CV status; No obvious hypovolemia; No obvious fluid overload   Other NRE: NONE   DID A NON-ROUTINE EVENT OCCUR?            Last vitals:  Vitals:    08/31/23 1535 08/31/23 1619   BP: 128/82 135/85   Pulse: 114 119   Resp: 18    Temp: (!) 38.4  C (101.2  F)    SpO2: 96% 100%       Electronically Signed By: Artemio Singh MD  August 31, 2023  6:27 PM

## 2023-08-31 NOTE — PROGRESS NOTES
Patient arrived from Or and placed on vent. Without incident.  Vent Mode: CMV/AC  (Continuous Mandatory Ventilation/ Assist Control)  Resp Rate (Set): 14 breaths/min  Tidal Volume (Set, mL): 450 mL  PEEP (cm H2O): 5 cmH2O  Resp: 18    Herber Flores, RT

## 2023-09-01 ENCOUNTER — ANESTHESIA EVENT (OUTPATIENT)
Dept: SURGERY | Facility: CLINIC | Age: 63
DRG: 137 | End: 2023-09-01
Payer: MEDICARE

## 2023-09-01 ENCOUNTER — APPOINTMENT (OUTPATIENT)
Dept: GENERAL RADIOLOGY | Facility: CLINIC | Age: 63
DRG: 137 | End: 2023-09-01
Attending: SURGERY
Payer: MEDICARE

## 2023-09-01 ENCOUNTER — APPOINTMENT (OUTPATIENT)
Dept: INTERPRETER SERVICES | Facility: CLINIC | Age: 63
DRG: 137 | End: 2023-09-01
Attending: SURGERY
Payer: MEDICARE

## 2023-09-01 ENCOUNTER — ANESTHESIA (OUTPATIENT)
Dept: SURGERY | Facility: CLINIC | Age: 63
DRG: 137 | End: 2023-09-01
Payer: MEDICARE

## 2023-09-01 LAB
ALBUMIN SERPL BCG-MCNC: 3.2 G/DL (ref 3.5–5.2)
ALLEN'S TEST: YES
ALP SERPL-CCNC: 82 U/L (ref 35–104)
ALT SERPL W P-5'-P-CCNC: 34 U/L (ref 0–50)
ANION GAP SERPL CALCULATED.3IONS-SCNC: 12 MMOL/L (ref 7–15)
APTT PPP: 30 SECONDS (ref 22–38)
AST SERPL W P-5'-P-CCNC: 20 U/L (ref 0–45)
BASE EXCESS BLDA CALC-SCNC: 0.8 MMOL/L (ref -9–1.8)
BILIRUB DIRECT SERPL-MCNC: 0.35 MG/DL (ref 0–0.3)
BILIRUB SERPL-MCNC: 0.8 MG/DL
BUN SERPL-MCNC: 10.1 MG/DL (ref 8–23)
CALCIUM SERPL-MCNC: 8.6 MG/DL (ref 8.8–10.2)
CHLORIDE SERPL-SCNC: 109 MMOL/L (ref 98–107)
CREAT SERPL-MCNC: 0.61 MG/DL (ref 0.51–0.95)
DEPRECATED HCO3 PLAS-SCNC: 23 MMOL/L (ref 22–29)
ERYTHROCYTE [DISTWIDTH] IN BLOOD BY AUTOMATED COUNT: 15.3 % (ref 10–15)
GFR SERPL CREATININE-BSD FRML MDRD: >90 ML/MIN/1.73M2
GLUCOSE BLDC GLUCOMTR-MCNC: 122 MG/DL (ref 70–99)
GLUCOSE BLDC GLUCOMTR-MCNC: 129 MG/DL (ref 70–99)
GLUCOSE BLDC GLUCOMTR-MCNC: 129 MG/DL (ref 70–99)
GLUCOSE BLDC GLUCOMTR-MCNC: 186 MG/DL (ref 70–99)
GLUCOSE BLDC GLUCOMTR-MCNC: 221 MG/DL (ref 70–99)
GLUCOSE BLDC GLUCOMTR-MCNC: 237 MG/DL (ref 70–99)
GLUCOSE SERPL-MCNC: 163 MG/DL (ref 70–99)
GRAM STAIN RESULT: ABNORMAL
GRAM STAIN RESULT: ABNORMAL
HCO3 BLD-SCNC: 25 MMOL/L (ref 21–28)
HCT VFR BLD AUTO: 44.5 % (ref 35–47)
HGB BLD-MCNC: 14.3 G/DL (ref 11.7–15.7)
INR PPP: 1.19 (ref 0.85–1.15)
LACTATE SERPL-SCNC: 1.2 MMOL/L (ref 0.7–2)
MAGNESIUM SERPL-MCNC: 2 MG/DL (ref 1.7–2.3)
MCH RBC QN AUTO: 31.1 PG (ref 26.5–33)
MCHC RBC AUTO-ENTMCNC: 32.1 G/DL (ref 31.5–36.5)
MCV RBC AUTO: 97 FL (ref 78–100)
O2/TOTAL GAS SETTING VFR VENT: 30 %
OXYHGB MFR BLD: 97 % (ref 92–100)
PCO2 BLD: 37 MM HG (ref 35–45)
PH BLD: 7.44 [PH] (ref 7.35–7.45)
PHOSPHATE SERPL-MCNC: 4.2 MG/DL (ref 2.5–4.5)
PLATELET # BLD AUTO: 152 10E3/UL (ref 150–450)
PO2 BLD: 95 MM HG (ref 80–105)
POTASSIUM SERPL-SCNC: 3.8 MMOL/L (ref 3.4–5.3)
PROT SERPL-MCNC: 5.7 G/DL (ref 6.4–8.3)
RADIOLOGIST FLAGS: ABNORMAL
RBC # BLD AUTO: 4.6 10E6/UL (ref 3.8–5.2)
SODIUM SERPL-SCNC: 144 MMOL/L (ref 136–145)
WBC # BLD AUTO: 19.3 10E3/UL (ref 4–11)

## 2023-09-01 PROCEDURE — 370N000017 HC ANESTHESIA TECHNICAL FEE, PER MIN: Performed by: DENTIST

## 2023-09-01 PROCEDURE — 250N000011 HC RX IP 250 OP 636: Performed by: ANESTHESIOLOGY

## 2023-09-01 PROCEDURE — 36415 COLL VENOUS BLD VENIPUNCTURE: CPT

## 2023-09-01 PROCEDURE — 200N000002 HC R&B ICU UMMC

## 2023-09-01 PROCEDURE — 83735 ASSAY OF MAGNESIUM: CPT

## 2023-09-01 PROCEDURE — 85730 THROMBOPLASTIN TIME PARTIAL: CPT

## 2023-09-01 PROCEDURE — 80053 COMPREHEN METABOLIC PANEL: CPT

## 2023-09-01 PROCEDURE — 87070 CULTURE OTHR SPECIMN AEROBIC: CPT | Performed by: DENTIST

## 2023-09-01 PROCEDURE — 0J910ZZ DRAINAGE OF FACE SUBCUTANEOUS TISSUE AND FASCIA, OPEN APPROACH: ICD-10-PCS | Performed by: DENTIST

## 2023-09-01 PROCEDURE — 85610 PROTHROMBIN TIME: CPT

## 2023-09-01 PROCEDURE — 250N000011 HC RX IP 250 OP 636

## 2023-09-01 PROCEDURE — 0W930ZZ DRAINAGE OF ORAL CAVITY AND THROAT, OPEN APPROACH: ICD-10-PCS | Performed by: DENTIST

## 2023-09-01 PROCEDURE — 250N000009 HC RX 250: Performed by: ANESTHESIOLOGY

## 2023-09-01 PROCEDURE — 999N000157 HC STATISTIC RCP TIME EA 10 MIN

## 2023-09-01 PROCEDURE — 250N000011 HC RX IP 250 OP 636: Performed by: INTERNAL MEDICINE

## 2023-09-01 PROCEDURE — 88305 TISSUE EXAM BY PATHOLOGIST: CPT | Mod: 26 | Performed by: PATHOLOGY

## 2023-09-01 PROCEDURE — 71045 X-RAY EXAM CHEST 1 VIEW: CPT | Mod: 26 | Performed by: RADIOLOGY

## 2023-09-01 PROCEDURE — 82248 BILIRUBIN DIRECT: CPT

## 2023-09-01 PROCEDURE — 250N000013 HC RX MED GY IP 250 OP 250 PS 637: Performed by: PHARMACIST

## 2023-09-01 PROCEDURE — 360N000077 HC SURGERY LEVEL 4, PER MIN: Performed by: DENTIST

## 2023-09-01 PROCEDURE — 82805 BLOOD GASES W/O2 SATURATION: CPT

## 2023-09-01 PROCEDURE — 250N000011 HC RX IP 250 OP 636: Mod: JZ | Performed by: PHARMACIST

## 2023-09-01 PROCEDURE — 272N000001 HC OR GENERAL SUPPLY STERILE: Performed by: DENTIST

## 2023-09-01 PROCEDURE — 5A1945Z RESPIRATORY VENTILATION, 24-96 CONSECUTIVE HOURS: ICD-10-PCS | Performed by: SURGERY

## 2023-09-01 PROCEDURE — 250N000013 HC RX MED GY IP 250 OP 250 PS 637: Performed by: INTERNAL MEDICINE

## 2023-09-01 PROCEDURE — 0WB30ZX EXCISION OF ORAL CAVITY AND THROAT, OPEN APPROACH, DIAGNOSTIC: ICD-10-PCS | Performed by: DENTIST

## 2023-09-01 PROCEDURE — 87205 SMEAR GRAM STAIN: CPT | Performed by: DENTIST

## 2023-09-01 PROCEDURE — 0CDXXZ1 EXTRACTION OF LOWER TOOTH, MULTIPLE, EXTERNAL APPROACH: ICD-10-PCS | Performed by: DENTIST

## 2023-09-01 PROCEDURE — 94003 VENT MGMT INPAT SUBQ DAY: CPT

## 2023-09-01 PROCEDURE — 250N000013 HC RX MED GY IP 250 OP 250 PS 637

## 2023-09-01 PROCEDURE — 83605 ASSAY OF LACTIC ACID: CPT

## 2023-09-01 PROCEDURE — 258N000003 HC RX IP 258 OP 636: Performed by: ANESTHESIOLOGY

## 2023-09-01 PROCEDURE — 88305 TISSUE EXAM BY PATHOLOGIST: CPT | Mod: TC | Performed by: DENTIST

## 2023-09-01 PROCEDURE — 85027 COMPLETE CBC AUTOMATED: CPT

## 2023-09-01 PROCEDURE — C9113 INJ PANTOPRAZOLE SODIUM, VIA: HCPCS | Mod: JZ | Performed by: INTERNAL MEDICINE

## 2023-09-01 PROCEDURE — 87075 CULTR BACTERIA EXCEPT BLOOD: CPT | Performed by: DENTIST

## 2023-09-01 PROCEDURE — 999N000248 HC STATISTIC IV INSERT WITH US BY RN

## 2023-09-01 PROCEDURE — 258N000003 HC RX IP 258 OP 636

## 2023-09-01 PROCEDURE — 84100 ASSAY OF PHOSPHORUS: CPT

## 2023-09-01 PROCEDURE — 250N000009 HC RX 250: Performed by: DENTIST

## 2023-09-01 PROCEDURE — 250N000012 HC RX MED GY IP 250 OP 636 PS 637: Performed by: INTERNAL MEDICINE

## 2023-09-01 PROCEDURE — 250N000025 HC SEVOFLURANE, PER MIN: Performed by: DENTIST

## 2023-09-01 PROCEDURE — 999N000065 XR CHEST PORT 1 VIEW

## 2023-09-01 RX ORDER — LABETALOL HYDROCHLORIDE 5 MG/ML
10 INJECTION, SOLUTION INTRAVENOUS EVERY 6 HOURS PRN
Status: DISCONTINUED | OUTPATIENT
Start: 2023-09-01 | End: 2023-09-06 | Stop reason: HOSPADM

## 2023-09-01 RX ORDER — ONDANSETRON 2 MG/ML
INJECTION INTRAMUSCULAR; INTRAVENOUS PRN
Status: DISCONTINUED | OUTPATIENT
Start: 2023-09-01 | End: 2023-09-01

## 2023-09-01 RX ORDER — DEXAMETHASONE SODIUM PHOSPHATE 10 MG/ML
10 INJECTION, SOLUTION INTRAMUSCULAR; INTRAVENOUS EVERY 8 HOURS
Status: DISCONTINUED | OUTPATIENT
Start: 2023-09-01 | End: 2023-09-02

## 2023-09-01 RX ORDER — HYDROMORPHONE HCL IN WATER/PF 6 MG/30 ML
.2-.4 PATIENT CONTROLLED ANALGESIA SYRINGE INTRAVENOUS
Status: CANCELLED | OUTPATIENT
Start: 2023-09-01

## 2023-09-01 RX ORDER — SODIUM CHLORIDE, SODIUM LACTATE, POTASSIUM CHLORIDE, CALCIUM CHLORIDE 600; 310; 30; 20 MG/100ML; MG/100ML; MG/100ML; MG/100ML
INJECTION, SOLUTION INTRAVENOUS CONTINUOUS PRN
Status: DISCONTINUED | OUTPATIENT
Start: 2023-09-01 | End: 2023-09-01

## 2023-09-01 RX ORDER — LIDOCAINE HYDROCHLORIDE AND EPINEPHRINE 10; 10 MG/ML; UG/ML
INJECTION, SOLUTION INFILTRATION; PERINEURAL PRN
Status: DISCONTINUED | OUTPATIENT
Start: 2023-09-01 | End: 2023-09-01 | Stop reason: HOSPADM

## 2023-09-01 RX ORDER — AMOXICILLIN 250 MG
2 CAPSULE ORAL 2 TIMES DAILY
Status: CANCELLED | OUTPATIENT
Start: 2023-09-01

## 2023-09-01 RX ORDER — MAGNESIUM SULFATE HEPTAHYDRATE 40 MG/ML
2 INJECTION, SOLUTION INTRAVENOUS ONCE
Status: COMPLETED | OUTPATIENT
Start: 2023-09-01 | End: 2023-09-01

## 2023-09-01 RX ORDER — POTASSIUM CHLORIDE 20MEQ/15ML
20 LIQUID (ML) ORAL ONCE
Status: COMPLETED | OUTPATIENT
Start: 2023-09-01 | End: 2023-09-01

## 2023-09-01 RX ORDER — FENTANYL CITRATE 50 UG/ML
INJECTION, SOLUTION INTRAMUSCULAR; INTRAVENOUS PRN
Status: DISCONTINUED | OUTPATIENT
Start: 2023-09-01 | End: 2023-09-01

## 2023-09-01 RX ORDER — SODIUM CHLORIDE, SODIUM LACTATE, POTASSIUM CHLORIDE, CALCIUM CHLORIDE 600; 310; 30; 20 MG/100ML; MG/100ML; MG/100ML; MG/100ML
INJECTION, SOLUTION INTRAVENOUS CONTINUOUS
Status: DISCONTINUED | OUTPATIENT
Start: 2023-09-01 | End: 2023-09-02

## 2023-09-01 RX ORDER — HYDRALAZINE HYDROCHLORIDE 20 MG/ML
10 INJECTION INTRAMUSCULAR; INTRAVENOUS EVERY 6 HOURS PRN
Status: DISCONTINUED | OUTPATIENT
Start: 2023-09-01 | End: 2023-09-06 | Stop reason: HOSPADM

## 2023-09-01 RX ADMIN — POTASSIUM CHLORIDE, DEXTROSE MONOHYDRATE AND SODIUM CHLORIDE: 150; 5; 450 INJECTION, SOLUTION INTRAVENOUS at 00:06

## 2023-09-01 RX ADMIN — MIDAZOLAM 2 MG: 1 INJECTION INTRAMUSCULAR; INTRAVENOUS at 13:42

## 2023-09-01 RX ADMIN — SODIUM CHLORIDE, POTASSIUM CHLORIDE, SODIUM LACTATE AND CALCIUM CHLORIDE: 600; 310; 30; 20 INJECTION, SOLUTION INTRAVENOUS at 17:54

## 2023-09-01 RX ADMIN — SODIUM CHLORIDE, POTASSIUM CHLORIDE, SODIUM LACTATE AND CALCIUM CHLORIDE: 600; 310; 30; 20 INJECTION, SOLUTION INTRAVENOUS at 06:00

## 2023-09-01 RX ADMIN — SUGAMMADEX 200 MG: 100 INJECTION, SOLUTION INTRAVENOUS at 15:18

## 2023-09-01 RX ADMIN — AMPICILLIN SODIUM AND SULBACTAM SODIUM 3 G: 2; 1 INJECTION, POWDER, FOR SOLUTION INTRAMUSCULAR; INTRAVENOUS at 11:00

## 2023-09-01 RX ADMIN — PHENYLEPHRINE HYDROCHLORIDE 100 MCG: 10 INJECTION INTRAVENOUS at 13:58

## 2023-09-01 RX ADMIN — DEXAMETHASONE SODIUM PHOSPHATE 10 MG: 10 INJECTION, SOLUTION INTRAMUSCULAR; INTRAVENOUS at 16:45

## 2023-09-01 RX ADMIN — MAGNESIUM SULFATE IN WATER 2 G: 40 INJECTION, SOLUTION INTRAVENOUS at 02:40

## 2023-09-01 RX ADMIN — PROPOFOL 40 MCG/KG/MIN: 10 INJECTION, EMULSION INTRAVENOUS at 11:30

## 2023-09-01 RX ADMIN — AMPICILLIN SODIUM AND SULBACTAM SODIUM 3 G: 2; 1 INJECTION, POWDER, FOR SOLUTION INTRAMUSCULAR; INTRAVENOUS at 16:35

## 2023-09-01 RX ADMIN — Medication 20 MG: at 13:57

## 2023-09-01 RX ADMIN — FENTANYL CITRATE 50 MCG: 50 INJECTION, SOLUTION INTRAMUSCULAR; INTRAVENOUS at 14:08

## 2023-09-01 RX ADMIN — INSULIN ASPART 2 UNITS: 100 INJECTION, SOLUTION INTRAVENOUS; SUBCUTANEOUS at 04:38

## 2023-09-01 RX ADMIN — HYDROMORPHONE HYDROCHLORIDE 0.5 MG: 1 INJECTION, SOLUTION INTRAMUSCULAR; INTRAVENOUS; SUBCUTANEOUS at 16:00

## 2023-09-01 RX ADMIN — PROPOFOL 50 MCG/KG/MIN: 10 INJECTION, EMULSION INTRAVENOUS at 16:42

## 2023-09-01 RX ADMIN — HYDROMORPHONE HYDROCHLORIDE 0.5 MG: 1 INJECTION, SOLUTION INTRAMUSCULAR; INTRAVENOUS; SUBCUTANEOUS at 12:42

## 2023-09-01 RX ADMIN — Medication 20 MG: at 14:23

## 2023-09-01 RX ADMIN — HYDROMORPHONE HYDROCHLORIDE 0.5 MG: 1 INJECTION, SOLUTION INTRAMUSCULAR; INTRAVENOUS; SUBCUTANEOUS at 14:20

## 2023-09-01 RX ADMIN — PANTOPRAZOLE SODIUM 40 MG: 40 INJECTION, POWDER, FOR SOLUTION INTRAVENOUS at 08:15

## 2023-09-01 RX ADMIN — SENNOSIDES 5 ML: 8.8 LIQUID ORAL at 22:02

## 2023-09-01 RX ADMIN — FENTANYL CITRATE 50 MCG: 50 INJECTION, SOLUTION INTRAMUSCULAR; INTRAVENOUS at 14:17

## 2023-09-01 RX ADMIN — AMPICILLIN SODIUM AND SULBACTAM SODIUM 3 G: 2; 1 INJECTION, POWDER, FOR SOLUTION INTRAMUSCULAR; INTRAVENOUS at 04:30

## 2023-09-01 RX ADMIN — SODIUM CHLORIDE, POTASSIUM CHLORIDE, SODIUM LACTATE AND CALCIUM CHLORIDE: 600; 310; 30; 20 INJECTION, SOLUTION INTRAVENOUS at 13:48

## 2023-09-01 RX ADMIN — PROPOFOL 55 MCG/KG/MIN: 10 INJECTION, EMULSION INTRAVENOUS at 01:36

## 2023-09-01 RX ADMIN — AMPICILLIN SODIUM AND SULBACTAM SODIUM 3 G: 2; 1 INJECTION, POWDER, FOR SOLUTION INTRAMUSCULAR; INTRAVENOUS at 22:00

## 2023-09-01 RX ADMIN — PROPOFOL 40 MCG/KG/MIN: 10 INJECTION, EMULSION INTRAVENOUS at 23:06

## 2023-09-01 RX ADMIN — POTASSIUM CHLORIDE 20 MEQ: 40 SOLUTION ORAL at 02:40

## 2023-09-01 RX ADMIN — CHLORHEXIDINE GLUCONATE 15 ML: 1.2 SOLUTION ORAL at 08:15

## 2023-09-01 RX ADMIN — ONDANSETRON 4 MG: 2 INJECTION INTRAMUSCULAR; INTRAVENOUS at 14:22

## 2023-09-01 RX ADMIN — ACETAMINOPHEN 975 MG: 325 TABLET, FILM COATED ORAL at 08:15

## 2023-09-01 RX ADMIN — INSULIN ASPART 1 UNITS: 100 INJECTION, SOLUTION INTRAVENOUS; SUBCUTANEOUS at 08:15

## 2023-09-01 RX ADMIN — CHLORHEXIDINE GLUCONATE 15 ML: 1.2 SOLUTION ORAL at 19:55

## 2023-09-01 RX ADMIN — HYDROMORPHONE HYDROCHLORIDE 0.5 MG: 1 INJECTION, SOLUTION INTRAMUSCULAR; INTRAVENOUS; SUBCUTANEOUS at 19:55

## 2023-09-01 ASSESSMENT — ACTIVITIES OF DAILY LIVING (ADL)
ADLS_ACUITY_SCORE: 39
ADLS_ACUITY_SCORE: 39
DRESSING/BATHING_DIFFICULTY: NO
WEAR_GLASSES_OR_BLIND: OTHER (SEE COMMENTS)
DOING_ERRANDS_INDEPENDENTLY_DIFFICULTY: NO
ADLS_ACUITY_SCORE: 30
WALKING_OR_CLIMBING_STAIRS_DIFFICULTY: NO
CONCENTRATING,_REMEMBERING_OR_MAKING_DECISIONS_DIFFICULTY: NO
ADLS_ACUITY_SCORE: 30
TOILETING_ISSUES: NO
FALL_HISTORY_WITHIN_LAST_SIX_MONTHS: NO
ADLS_ACUITY_SCORE: 34
ADLS_ACUITY_SCORE: 30
ADLS_ACUITY_SCORE: 30
CHANGE_IN_FUNCTIONAL_STATUS_SINCE_ONSET_OF_CURRENT_ILLNESS/INJURY: NO
ADLS_ACUITY_SCORE: 30
DIFFICULTY_EATING/SWALLOWING: OTHER (SEE COMMENTS)

## 2023-09-01 NOTE — PROGRESS NOTES
Restraint Note:  Bilateral mitts order obtained and remain on for line/tube pulling while weaning sedation. Criteria explained to patient with . Range of motion performed at routine intervals and no signs of injury.   Discontinue when pt oriented and shows no signs of line/tube pulling.

## 2023-09-01 NOTE — ANESTHESIA PREPROCEDURE EVALUATION
Anesthesia Pre-Procedure Evaluation    Patient: Dari Hernandez   MRN: [id number] : 1960        Procedure : Procedure(s):  Incision and drainage neck, combined, possible teeth extraction          No past medical history on file.   Past Surgical History:   Procedure Laterality Date    TRACHEOSTOMY N/A 2023    Procedure: INTUBATION;  Surgeon: Jose R Montgomery MD;  Location: [facility] Main OR      No Known Allergies   Social History     Tobacco Use    Smoking status: Never    Smokeless tobacco: Never   Substance Use Topics    Alcohol use: Not on file      Wt Readings from Last 1 Encounters:   23 69.9 kg (154 lb 1.6 oz)        Anesthesia Evaluation   Pt has had prior anesthetic. Type: General and MAC.        ROS/MED HX  ENT/Pulmonary: Comment: Respiratory failure.  On mechanical ventilation.      Neurologic:  - neg neurologic ROS     Cardiovascular:  - neg cardiovascular ROS     METS/Exercise Tolerance:     Hematologic: Comments: Leukocytosis.      Musculoskeletal:  - neg musculoskeletal ROS     GI/Hepatic:  - neg GI/hepatic ROS     Renal/Genitourinary:  - neg Renal ROS     Endo: Comment: Hypercholesterolemia.    (+)               Obesity,       Psychiatric/Substance Use:  - neg psychiatric ROS     Infectious Disease: Comment: Neck abscess.      Malignancy:       Other:            Physical Exam    Airway      Comment: Abscess.    Mallampati: I   TM distance: > 3 FB   Neck ROM: full   Mouth opening: > 3 cm    Respiratory Devices and Support         Dental       (+) Minor Abnormalities - some fillings, tiny chips      Cardiovascular   cardiovascular exam normal       Rhythm and rate: regular and normal     Pulmonary   pulmonary exam normal        breath sounds clear to auscultation           OUTSIDE LABS:  CBC:   Lab Results   Component Value Date    WBC 19.3 (H) 2023    WBC 17.4 (H) 2023    HGB 14.3 2023    HGB 16.6 (H) 2023    HCT 44.5 2023    HCT 48.9 (H) 2023      09/01/2023     08/31/2023     BMP:   Lab Results   Component Value Date     09/01/2023     08/31/2023    POTASSIUM 3.8 09/01/2023    POTASSIUM 4.0 08/31/2023    CHLORIDE 109 (H) 09/01/2023    CHLORIDE 103 08/31/2023    CO2 23 09/01/2023    CO2 26 08/31/2023    BUN 10.1 09/01/2023    BUN 10.5 08/31/2023    CR 0.61 09/01/2023    CR 0.6 08/31/2023     (H) 09/01/2023     (H) 09/01/2023     COAGS:   Lab Results   Component Value Date    PTT 30 09/01/2023    INR 1.19 (H) 09/01/2023     POC: No results found for: BGM, HCG, HCGS  HEPATIC:   Lab Results   Component Value Date    ALBUMIN 3.2 (L) 09/01/2023    PROTTOTAL 5.7 (L) 09/01/2023    ALT 34 09/01/2023    AST 20 09/01/2023    ALKPHOS 82 09/01/2023    BILITOTAL 0.8 09/01/2023     OTHER:   Lab Results   Component Value Date    PH 7.44 09/01/2023    LACT 1.2 09/01/2023    VIC 8.6 (L) 09/01/2023    PHOS 4.2 09/01/2023    MAG 2.0 09/01/2023       Anesthesia Plan    ASA Status:  3    NPO Status:  NPO Appropriate    Anesthesia Type: General.     - Airway: ETT      Maintenance: Inhalation.        Consents    Anesthesia Plan(s) and associated risks, benefits, and realistic alternatives discussed. Questions answered and patient/representative(s) expressed understanding.     - Discussed: Risks, Benefits and Alternatives for BOTH SEDATION and the PROCEDURE were discussed     - Discussed with:  Patient      - Extended Intubation/Ventilatory Support Discussed: Yes.      - Patient is DNR/DNI Status: No     Use of blood products discussed: Yes.     - Discussed with: Patient.     Postoperative Care    Pain management: IV analgesics.   PONV prophylaxis: Ondansetron (or other 5HT-3), Dexamethasone or Solumedrol     Comments:    Other Comments: The material risks, benefits, and alternatives were discussed in detail.  The patient agrees to proceed.  The patient has no other complaints at this time.            Deepak Abdul MD

## 2023-09-01 NOTE — ANESTHESIA CARE TRANSFER NOTE
Patient: Dari Hernandez    Procedure: Procedure(s):  Incision and drainage neck, combined, possible teeth extraction       Diagnosis: Neck abscess [L02.11]  Diagnosis Additional Information: No value filed.    Anesthesia Type:   No value filed.     Note:    Oropharynx: endotracheal tube in place  Level of Consciousness: iatrogenic sedation      Independent Airway: airway patency not satisfactory and stable  Dentition: S/P dental procedure  Vital Signs Stable: post-procedure vital signs reviewed and stable  Report to RN Given: handoff report given  Patient transferred to: ICU  Comments: Pt transported to ICU intubated, sedated, and hand-ventilated by CRNA, surgical resident, and RN. VSS throughout transport. Report given to ICU team and all questions answered.  ICU Handoff: Call for PAUSE to initiate/utilize ICU HANDOFF, Identified Patient, Identified Responsible Provider, Reviewed the Pertinent Medical History, Discussed Surgical Course, Reviewed Intra-OP Anesthesia Management and Issues during Anesthesia, Set Expectations for Post Procedure Period and Allowed Opportunity for Questions and Acknowledgement of Understanding      Vitals:  Vitals Value Taken Time   /75    Temp 36    Pulse 66    Resp 14    SpO2 98        Electronically Signed By: MART ENGLE CRNA  September 1, 2023  3:25 PM

## 2023-09-01 NOTE — CONSULTS
ORAL & MAXILLOFACIAL SURGERY (OMFS)   CONSULT    Patient: Dari Hernandez  : 1960  MRN: [id number]  Date of Admission: 2023  Requesting Provider: Gokul Lobato    OMFS consulted regarding facial swelling.      Assessment   63 year old female presents with left sided submandibular, sublingual space abscesses and carious teeth #22,23,30,31. There is also an ulcer and likely abscess of left lateral tongue. The abscesses are currently draining through floor of mouth.       Plan   Incision and drainage in the OR and extraction of any teeth contributing to the infection. This will be added on later today.   Continue Unasyn   OMFS will continue to follow while inpatient.  Appreciate remaining care per [facility]    Thank you for this consult. Please contact the OMFS resident on-call with questions or concerns.    Discussed with Upper Resident, Dr. Gerardo Burch, who discussed with Staff. Dr. Hemanth Vinson.    Mercedes Aguilar DDS   Oral & Maxillofacial Surgery, Intern  Pager: [phone]    ___________________________________________________________________      History of present illness   63 year old female with no significant PMH transferred from outside hospital. HPI from chart review reveals that patient presented first to the ED [facility] yesterday (Thursday) reporting pain to her left jaw/teeth on Wednesday that has progressed to swelling.  She reported some difficulty swallowing and was occasionally having to spit out her saliva.  She was able to speak but her voice was muffled.  Her vitals were notable for tachycardia and fever on arrival to the ED [facility]. Given the clinical presentations and CT neck scan showed findings suspicious for edema/abscess within floor of mouth, patient was then taken to the OR to be intubated by anesthesia and ENT. She is now transferred to [facility] for further evaluation by OMFS. Currently at [facility] patient is intubated and remains stable.           Past medical history   No past medical history on file.    Past surgical history     Past Surgical History:   Procedure Laterality Date    TRACHEOSTOMY N/A 8/31/2023    Procedure: INTUBATION;  Surgeon: Jose R Montgomery MD;  Location: [facility] medications     Current Facility-Administered Medications   Medication    acetaminophen (TYLENOL) tablet 975 mg    ampicillin-sulbactam (UNASYN) 3 g vial to attach to  mL bag    chlorhexidine (PERIDEX) 0.12 % solution 15 mL    dextrose 5% and 0.45% NaCl + KCl 20 mEq/L infusion    glucose gel 15-30 g    Or    dextrose 50 % injection 25-50 mL    Or    glucagon injection 1 mg    HYDROmorphone (PF) (DILAUDID) injection 0.3-0.5 mg    insulin aspart (NovoLOG) injection (RAPID ACTING)    propofol (DIPRIVAN) infusion    And    propofol (DIPRIVAN) bolus from infusion pump 10 mg    And    Medication Instruction    naloxone (NARCAN) injection 0.2 mg    Or    naloxone (NARCAN) injection 0.4 mg    Or    naloxone (NARCAN) injection 0.2 mg    Or    naloxone (NARCAN) injection 0.4 mg    pantoprazole (PROTONIX) 2 mg/mL suspension 40 mg    Or    pantoprazole (PROTONIX) IV push injection 40 mg                  Allergies   No Known Allergies     Family history   No family history on file.    Social history     Social History     Socioeconomic History    Marital status:      Spouse name: Not on file    Number of children: Not on file    Years of education: Not on file    Highest education level: Not on file   Occupational History    Not on file   Tobacco Use    Smoking status: Never    Smokeless tobacco: Never   Substance and Sexual Activity    Alcohol use: Not on file    Drug use: Not on file    Sexual activity: Not on file   Other Topics Concern    Not on file   Social History Narrative    Not on file     Social Determinants of Health     Financial Resource Strain: Not on file   Food Insecurity: Not on file   Transportation Needs: Not on file   Physical Activity:  Not on file   Stress: Not on file   Social Connections: Not on file   Intimate Partner Violence: Not on file   Housing Stability: Not on file       Review of systems   10 point ROS reviewed and negative aside from listed in HPI        Objective/Physical examination   Vitals: Blood pressure 91/62, pulse 67, temperature 98.4  F (36.9  C), temperature source Axillary, resp. rate 12, SpO2 99 %.    GEN: WD/WN female, NAD  HEENT: NC/AT, left sided facial edema with some induration at the mandible extending anteriorly to the submental area and posteriorly to the retropharyngeal region. No erythema or extra oral drainage.   I/O: FOM is raised and firm on the left side with a fistula with size of 2-3 mm at the sublingual area near root tip #22, active draining when tongue is retracted aside and the opening is pushed with pressure from suction or finger. There is also an ulceration with size 7 x 15 mm at the largest dimension on left lateral border of tongue with some erythema and well-defined border.     Tooth #22 root tip   Tooth #23 no obvious gross caries though with periapical radiolucency on CT scan   Tooth #30 root tip   Tooth #31 grossly caries, class II mobility   Complete edentulous maxilla, tooth #17-21 are missing.   Tooth #22,23,24,25,26,27,28,29,30,31 are present     CV: RRR, warm and well-perfused  PULM: intubated with full ventilation support   GI: Soft, ND/NT  MSK: ROTH, no peripheral extremity edema  NEURO: AAOx4, CN II-XII intact bilaterally  PSYCH: Appropriate mood and affect     Laboratory, pathology and radiology data     Lab results:   CBC RESULTS:   Recent Labs   Lab Test 09/01/23  0026   WBC 19.3*   RBC 4.60   HGB 14.3   HCT 44.5   MCV 97   MCH 31.1   MCHC 32.1   RDW 15.3*          Last Basic Metabolic Panel:  Lab Results   Component Value Date     08/31/2023      Lab Results   Component Value Date    POTASSIUM 4.0 08/31/2023     Lab Results   Component Value Date    CHLORIDE 103  08/31/2023     Lab Results   Component Value Date    VIC 9.6 08/31/2023     Lab Results   Component Value Date    CO2 26 08/31/2023     Lab Results   Component Value Date    BUN 10.5 08/31/2023     Lab Results   Component Value Date    CR 0.6 08/31/2023    CR 0.70 08/31/2023     Lab Results   Component Value Date     08/31/2023       IMAGING RESULTS (Include outside hospital results)     Independently reviewed - left sided submental, submandibular, sublingual space abscess and tooth #22,23 with large periapical radiolucencies.     IMPRESSION:  1.  Findings suspicious for confluent edema/abscess within the left floor of mouth versus superinfected ranula, which extends into the submandibular space via mylohyoid boutonniere deformity. Recommend ENT consultation and clinical correlation for   concerns of Jarocho angina.  2.  Primary versus secondary inflammatory changes within the pharynx and supraglottic larynx indicating pharyngitis and supraglottitis with reactive left parapharyngeal and lateral retropharyngeal edema.  3.  Favor secondarily inflamed left sublingual and submandibular glands with surrounding soft tissue edema extending inferiorly in the neck, as detailed.   4.  Dental disease as detailed. Note absence of left mandibular molar and premolar teeth.    Recent Results (from the past 48 hour(s))   Soft tissue neck CT w contrast    Narrative    EXAM: CT SOFT TISSUE NECK W CONTRAST  LOCATION: [facility]  DATE/TIME: 8/31/2023 4:35 PM CDT    INDICATION: concern for ludwigs, dental pain  COMPARISON: None.  CONTRAST: isovue 370  90ml  TECHNIQUE: Routine CT Soft Tissue Neck with IV contrast. Multiplanar reformats. Dose reduction techniques were used.    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 Contiguous confluent hypoattenuation with faint peripheral enhancement within the left sublingual space and  traversing the deformity on the   left extending into the left submandibular space extending toward the prolapsed sublingual gland measuring up to 5.5 cm AP dimension and 3.2 x 1.5 cm on the coronal plane. The oral tongue is displaced superiorly and rightward with ill-defined regions of   hypoattenuation, which may represent reactive edema. Edematous submucosal thickening throughout the pharynx, asymmetric to the left, extending into the epiglottis and aryepiglottic folds compatible with pharyngitis/supraglottitis. Less distinct edema   effacing fat planes within the left parapharyngeal and submandibular spaces propagating inferiorly into the left ventral neck as well as along the strap muscles to the level of the thyroid gland. Additional superficial subcutaneous soft tissue edema at   and below the level of the mandible. Small amount of retropharyngeal edema on the left above the level of the hyoid bone.    LYMPH NODES: No lymphadenopathy by size criteria.     SALIVARY GLANDS: Enlarged and hyperenhancing left sublingual and submandibular glands may be secondarily inflamed. No radiopaque sialolithiasis.    THYROID: Homogenous. No mass.     VESSELS: Grossly patent.    VISUALIZED INTRACRANIAL/ORBITS/SINUSES: No visible intracranial abnormality. Visualized orbits are unremarkable. Imaged sinuses and tympanomastoid cavities are clear. No left mandibular molar/premolar teeth. Dental disease includes large carious erosion   of right mandibular molar and smaller carious erosion of left mandibular canine. Large periapical abscess of tooth #23.    OTHER: Lung apices are clear. No acute or aggressive osseous lesions.      Impression    IMPRESSION:  1.  Findings suspicious for confluent edema/abscess within the left floor of mouth versus superinfected ranula, which extends into the submandibular space via mylohyoid boutonniere deformity. Recommend ENT consultation and clinical correlation for   concerns of Jarocho angina.  2.   Primary versus secondary inflammatory changes within the pharynx and supraglottic larynx indicating pharyngitis and supraglottitis with reactive left parapharyngeal and lateral retropharyngeal edema.  3.  Favor secondarily inflamed left sublingual and submandibular glands with surrounding soft tissue edema extending inferiorly in the neck, as detailed.   4.  Dental disease as detailed. Note absence of left mandibular molar and premolar teeth.    David Spears MD notified provider, SHAYY ORTEGA, of results via telephone at 8/31/2023 5:22 PM CDT, who acknowledge understanding.   XR Chest Port 1 View    Narrative    EXAM: XR CHEST PORT 1 VIEW  LOCATION: [facility]  DATE: 8/31/2023    INDICATION: Endotracheal tube positioning  COMPARISON: None.      Impression    IMPRESSION:     Tip of the endotracheal tube is at the brissa directed towards the left main bronchus. Retracting the ET tube 2 to 3 cm is suggested.    Focal opacity in the medial basal left lower lobe consistent with atelectatic or consolidated lung. Subsegmental opacities in the right lower lobe consistent with atelectasis.    Cardiac silhouette is normal in size.    No visible pleural fluid or pneumothorax.   XR Abdomen Port 1 View    Narrative    EXAM: XR ABDOMEN PORT 1 VIEW  LOCATION: [facility]  DATE: 8/31/2023    INDICATION: OG TUBE PLACEMENT  COMPARISON: Gastric sump over the      Impression    IMPRESSION: Upper abdomen in the region of the stomach in expected location. Unremarkable bowel gas pattern. Osseous structures appear unremarkable.   XR Chest Port 1 View    Impression    RESIDENT PRELIMINARY INTERPRETATION  IMPRESSION:   1. Endotracheal tube tip projects over the proximal right mainstem  bronchus. Recommend retraction.  2. Left basilar/retrocardiac opacities, which may represent  atelectasis/pulmonary edema and/or infection.  3. Small left and possible trace right pleural  effusions.    [Urgent Result: Right mainstem intubation]    Finding was identified on 8/31/2023 11:48 PM.     DALLAS Bartlett was contacted by Dr. Hollis at 8/31/2023 11:51 PM and  verbalized understanding of the urgent finding.    XR Abdomen Port 1 View    Impression    RESIDENT PRELIMINARY INTERPRETATION  IMPRESSION: Enteric tube tip and sidehole project over midabdomen,  presumably within stomach.

## 2023-09-01 NOTE — H&P
"  SURGICAL ICU ADMISSION NOTE  09/01/2023    PRIMARY TEAM: SICU  PRIMARY PHYSICIAN: Gokul Lobato MD  REASON FOR CRITICAL CARE ADMISSION: Ventilator Management  ADMITTING PHYSICIAN: Gokul Lobato MD    ASSESSMENT: Dari Hernandez is a 63-year-old woman with recent onset of dental infection that progressed to anterior Jarocho's angina, requiring operative intubation for airway protection earlier today by ENT at [facility]. After intubation she was then transferred  to [facility] SICU on 8/31/23 for OMFS eval.    PLAN:    HEENT:  # Jarocho's Angina in the setting of recent dental infection  - Required emergent intubation at OSH to maintain airways  - OMFS consult   - Decadron 10mg IV given at 4PM at OSH  - Ongoing Amp-Sulbactam for IV ABX  - Per ENT at OSH: \"odontogenic infection involving teeth numbers 22 and 23 with periapical lucency and floor of mouth surgically drainable fluid collection only in the sublingual space on the left extending into the submandibular space.\"    Neurological:  # Acute pain   # Agitation   - Monitor neurological status. Delirium preventions and precautions.  - Pain: Hydromorphone PRN, APAP Q8H  - Sedation: Propofol gtt     Pulmonary:   Vital Signs: Most Recent  Ranges (24 hours)   Resp: 12  SpO2: 99 % Resp  Min: 11  Max: 27  SpO2  Min: 96 %  Max: 100 %   O2 Device:   Vent  Vent Mode: CMV/AC  (Continuous Mandatory Ventilation/ Assist Control)  FiO2 (%): 30 %  Resp Rate (Set): 12 breaths/min  Tidal Volume (Set, mL): 400 mL  PEEP (cm H2O): 5 cmH2O  Resp: 12    # Acute hypoxic respiratory support   - VENT: CMV-AC. Continue full vent support. PST when meets criteria.  Ventilatory bundle. HOB elevation   - Goal SpO2 > 90%.    Cardiovascular:    Vital Signs: Most Recent  Ranges (24 hours)   Pulse: 67  BP: 91/62    Pulse  Min: 67  Max: 119  BP  Min: 83/59  Max: 135/85  No data recorded  No data recorded     # HD Monitoring  - Continue HD monitoring  - Not on pressors  - Lactate " pending    Gastroenterology/Nutrition:  # Nutrition   - NPO for Medical/Clinical Reasons Except for: Meds   - OGT in place, AXR pending confirmation  - No indication for parenteral nutrition.    # Nausea  - Ondansetron IV/PO PRN    Renal/Fluids/Electrolytes:   No intake/output data recorded.  I/O this shift:  In: 78.13 [I.V.:18.13; NG/GT:60]  Out: 125 [Urine:125]  Recent Labs   Lab 23  1625 23  1608   NA  --  142   POTASSIUM  --  4.0   CHLORIDE  --  103   VIC  --  9.6   CO2  --  26   BUN  --  10.5   CR 0.6 0.70       # Volume Status  - mIVF of D5-1/2 NS 20K @ 100 ml/hr   - Continue to monitor I/Os  - Will in place    Endocrine:  Recent Labs   Lab 23  2330 23  1847 23  1608   * 107* 123*     # Stress Hyperglycemia  - SSI (Medium Intensity), No Basal insulin, Glucose Checks Q4H  - Goal to keep B-180 for optimal wound healing     ID:  Recent Labs   Lab 23  0026 23  1608   WBC 19.3* 17.4*     Positive cultures:  No results found for: CULT    # Jarocho's Angina  - Continue broad spectrum IV ABX as mentioned above  - Trend CBCs  - Blood Cultures x2 done at OSH    Heme:  Recent Labs   Lab 23  0026 23  1608   HGB 14.3 16.6*    176     - Hemoglobin elevated at OSH.  - Transfuse if hgb <7.0. Trend CBCs    MSK:  # Weakness and deconditioning of critical illness   - Physical and occupational therapy consult    General Cares/Prophylaxis:    DVT Prophylaxis: Pneumatic Compression Devices  GI Prophylaxis: PPI  Restraints: Restraints for medical healing needed: YES  LDAs:  - ETT  - PIVx2  - Will  - OGT    Disposition:  - SICU.   - Barriers to floor status: Ventilator Management    Patient seen, findings and plan discussed with surgical ICU staff, Dr. Lobato.    Yury Austin MD   General Surgery Resident    - - - - - - - - - - - - - - - - - - - - - - - - - - - - - - - - - - - - - - - - - - - - - -   HISTORY PRESENTING ILLNESS: Recent dental infection with  symptoms that began 8/30/23 and now with progressive left jaw pain and swelling. Progressive difficulty with swallowing and muffled voice. After initial evaluation she had a CT scan as well as a fiberoptic evaluation of her airway.     CT imaging of the neck with contrast showed odontogenic infection involving teeth numbers 22 and 23 with periapical lucency and floor of mouth surgically drainable fluid collection only in the sublingual space on the left extending into the submandibular space. There is some reactive lymphadenopathy.     She was intubated with a glide scope without event. Swelling has completely spared her epiglottis and larynx.     REVIEW OF SYSTEMS: ROS unable to be completed d/t sedation.    PAST MEDICAL HISTORY:   No past medical history on file.    SURGICAL HISTORY:   Past Surgical History:   Procedure Laterality Date    TRACHEOSTOMY N/A 8/31/2023    Procedure: INTUBATION;  Surgeon: Jose R Montgomery MD;  Location: [facility] Main OR       SOCIAL HISTORY:   Social History     Tobacco Use    Smoking status: Never    Smokeless tobacco: Never       FAMILY HISTORY:   No family history on file.    ALLERGIES:   No Known Allergies    MEDICATIONS:  [COMPLETED] ampicillin-sulbactam (UNASYN) 3 g vial to attach to  mL bag  [COMPLETED] dexAMETHasone PF (DECADRON) injection 10 mg  [COMPLETED] iopamidol (ISOVUE-370) solution 90 mL  [COMPLETED] lidocaine (XYLOCAINE) 2 % external gel 10 mL    acetaminophen (TYLENOL EXTRA STRENGTH) 500 MG tablet, [ACETAMINOPHEN (TYLENOL EXTRA STRENGTH) 500 MG TABLET] Take 2 tablets (1,000 mg total) by mouth 3 (three) times a day as needed for pain.        PHYSICAL EXAMINATION:  Vitals: Most Recent  Ranges (Last 24 hours)   Temp: 98.4  F (36.9  C)  Pulse: 67  BP: 91/62  Resp: 12  SpO2: 99 %  O2 Device: Mechanical Ventilator Temp  Min: 98.4  F (36.9  C)  Max: 101.2  F (38.4  C)  Pulse  Min: 67  Max: 119  BP  Min: 83/59  Max: 135/85  Resp  Min: 11  Max: 27  SpO2  Min: 96 %  Max:  100 %     Physical Exam:  Gen: Appears stated age, NAD  HEENT: NC/AT, Sclera Anicteric, ETT in place. OGT in place. L neck swelling.  Neuro: Sedated  Psych: Unable to assess d/t sedation  CV: Normocardic, Normotensive  Pulm: Intubated on ventilator  ABD: ABD soft   Skin: No obvious rashes, jaundice, erythema       LABS:   Arterial Blood Gases   Recent Labs   Lab 08/31/23 2005   PH 7.46*   PCO2 35   PO2 125*   HCO3 25     Complete Blood Count   Recent Labs   Lab 09/01/23  0026 08/31/23  1608   WBC 19.3* 17.4*   HGB 14.3 16.6*    176     Basic Metabolic Panel  Recent Labs   Lab 08/31/23  2330 08/31/23  1847 08/31/23  1625 08/31/23  1608   NA  --   --   --  142   POTASSIUM  --   --   --  4.0   CHLORIDE  --   --   --  103   CO2  --   --   --  26   BUN  --   --   --  10.5   CR  --   --  0.6 0.70   * 107*  --  123*     Liver Function Tests  Recent Labs   Lab 08/31/23  1608   AST 28   ALT 49   ALKPHOS 99   BILITOTAL 1.7*   ALBUMIN 4.0     Pancreatic Enzymes  No lab results found in last 7 days.  Coagulation Profile  No lab results found in last 7 days.  Cultures  No lab results found in last 7 days.    IMAGING:   Recent Results (from the past 24 hour(s))   Soft tissue neck CT w contrast    Narrative    EXAM: CT SOFT TISSUE NECK W CONTRAST  LOCATION: [facility]  DATE/TIME: 8/31/2023 4:35 PM CDT    INDICATION: concern for ludwigs, dental pain  COMPARISON: None.  CONTRAST: isovue 370  90ml  TECHNIQUE: Routine CT Soft Tissue Neck with IV contrast. Multiplanar reformats. Dose reduction techniques were used.    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 Contiguous confluent hypoattenuation with faint peripheral enhancement within the left sublingual space and traversing the deformity on the   left extending into the left submandibular space extending toward the prolapsed sublingual gland measuring up to  5.5 cm AP dimension and 3.2 x 1.5 cm on the coronal plane. The oral tongue is displaced superiorly and rightward with ill-defined regions of   hypoattenuation, which may represent reactive edema. Edematous submucosal thickening throughout the pharynx, asymmetric to the left, extending into the epiglottis and aryepiglottic folds compatible with pharyngitis/supraglottitis. Less distinct edema   effacing fat planes within the left parapharyngeal and submandibular spaces propagating inferiorly into the left ventral neck as well as along the strap muscles to the level of the thyroid gland. Additional superficial subcutaneous soft tissue edema at   and below the level of the mandible. Small amount of retropharyngeal edema on the left above the level of the hyoid bone.    LYMPH NODES: No lymphadenopathy by size criteria.     SALIVARY GLANDS: Enlarged and hyperenhancing left sublingual and submandibular glands may be secondarily inflamed. No radiopaque sialolithiasis.    THYROID: Homogenous. No mass.     VESSELS: Grossly patent.    VISUALIZED INTRACRANIAL/ORBITS/SINUSES: No visible intracranial abnormality. Visualized orbits are unremarkable. Imaged sinuses and tympanomastoid cavities are clear. No left mandibular molar/premolar teeth. Dental disease includes large carious erosion   of right mandibular molar and smaller carious erosion of left mandibular canine. Large periapical abscess of tooth #23.    OTHER: Lung apices are clear. No acute or aggressive osseous lesions.      Impression    IMPRESSION:  1.  Findings suspicious for confluent edema/abscess within the left floor of mouth versus superinfected ranula, which extends into the submandibular space via mylohyoid boutonniere deformity. Recommend ENT consultation and clinical correlation for   concerns of Jarocho angina.  2.  Primary versus secondary inflammatory changes within the pharynx and supraglottic larynx indicating pharyngitis and supraglottitis with reactive  left parapharyngeal and lateral retropharyngeal edema.  3.  Favor secondarily inflamed left sublingual and submandibular glands with surrounding soft tissue edema extending inferiorly in the neck, as detailed.   4.  Dental disease as detailed. Note absence of left mandibular molar and premolar teeth.    David Spears MD notified provider, SHAYY ORTEGA, of results via telephone at 8/31/2023 5:22 PM CDT, who acknowledge understanding.   XR Chest Port 1 View    Narrative    EXAM: XR CHEST PORT 1 VIEW  LOCATION: [facility]  DATE: 8/31/2023    INDICATION: Endotracheal tube positioning  COMPARISON: None.      Impression    IMPRESSION:     Tip of the endotracheal tube is at the brissa directed towards the left main bronchus. Retracting the ET tube 2 to 3 cm is suggested.    Focal opacity in the medial basal left lower lobe consistent with atelectatic or consolidated lung. Subsegmental opacities in the right lower lobe consistent with atelectasis.    Cardiac silhouette is normal in size.    No visible pleural fluid or pneumothorax.   XR Abdomen Port 1 View    Narrative    EXAM: XR ABDOMEN PORT 1 VIEW  LOCATION: [facility]  DATE: 8/31/2023    INDICATION: OG TUBE PLACEMENT  COMPARISON: Gastric sump over the      Impression    IMPRESSION: Upper abdomen in the region of the stomach in expected location. Unremarkable bowel gas pattern. Osseous structures appear unremarkable.   XR Chest Port 1 View    Impression    RESIDENT PRELIMINARY INTERPRETATION  IMPRESSION:   1. Endotracheal tube tip projects over the proximal right mainstem  bronchus. Recommend retraction.  2. Left basilar/retrocardiac opacities, which may represent  atelectasis/pulmonary edema and/or infection.  3. Small left and possible trace right pleural effusions.    [Urgent Result: Right mainstem intubation]    Finding was identified on 8/31/2023 11:48 PM.     DALLAS Bartlett was contacted by Dr. Hollis at 8/31/2023 11:51  PM and  verbalized understanding of the urgent finding.    XR Abdomen Port 1 View    Impression    RESIDENT PRELIMINARY INTERPRETATION  IMPRESSION: Enteric tube tip and sidehole project over midabdomen,  presumably within stomach.       CURRENT MEDICATIONS:

## 2023-09-01 NOTE — DISCHARGE SUMMARY
"    ICU Discharge Summary    Admit date: 8/31/2023    Patient YOB: 1960     Date and time of discharge: August 31, 2023, 9:36 PM    Reason for Admission: Shortness of breath     Hospital Summary: Ms. Hernandez is a 64yo lady with no past medical history significant who presented to the ED at [facility] on 8/31/23 with complaints of left jaw pain which progressed to swelling. In the ED she was noted to have difficulty swallowing and managing her secretions in addition to exhibiting a muffled voice. Imaging demonstrated edema/abscess in the floor of the mouth and she was brought emergently to the OR to undergo intubation. Broad spectrum antibiotics were initiated and a transfer was arranged to [facility] for operative management there.        Hospital summary by organ system:  NEURO:Intubated and sedated presently. No focal deficits.  CVS:Noted to be hemodynamically stable presently.  PULM: Acute respiratory failure with hypoxia secondary to airway compromise from Jarocho's angina. Emergently intubated in the OR by ENT and anesthesia with a 7.0 ETT.  GI:NPO.  RENAL:NO acute issues. Continue to monitor I/O.  ID: Noted to have Ludvigs angina and was initiated on Unasyn for this. Will continue this on transfer. Blood cultures sent and was administered a one time dose of Decadron.  HEME/ONC:No acute issues.  ENDOCRINE:No acute issues.    Consults:    ENT    Significant Diagnostic Studies:    CT Head and Neck.    Treatments:    antibiotics: Unasyn.    Surgical Procedures Performed: None.    /61   Pulse 83   Temp 100  F (37.8  C) (Axillary)   Resp 14   Ht 1.575 m (5' 2\")   Wt 70 kg (154 lb 5.2 oz)   SpO2 97%   BMI 28.23 kg/m    Physical Exam  Constitutional:       Comments: NAD.   HENT:      Head: Normocephalic.      Comments: Swelling noted in submandibular area.     Mouth/Throat:      Comments: Intubated.  Cardiovascular:      Rate and Rhythm: Normal rate and regular rhythm.   Pulmonary:      " Effort: Pulmonary effort is normal.      Breath sounds: Normal breath sounds.   Abdominal:      General: Abdomen is flat.   Neurological:      General: No focal deficit present.          Principal Diagnosis:  Jarocho's angina     Other Diagnoses: Principal Problem:    Jarocho's angina      Admitting Physician: No admitting provider for patient encounter.     Primary Care Physician: No Ref-Primary, Physician    Discharge Physician: MD Mitali Cuevas  Pulmonary and Critical Care  [id number]

## 2023-09-01 NOTE — PLAN OF CARE
ICU End of Shift Summary. See flowsheets for vital signs and detailed assessment.    Changes this shift: Prior to OR, RASS -2/-3, Prop at 40/hr, follows commands, track, nods to yes/no. Post OR, patient RASS -3/-4, prop weaned down from 80/hr to 40/hr, arousing to voice. Dilaudid given for mouth/throat pain, good relief. CMV: 30%, peep 5. Per SICU, no plans to wean/PS today. OG clamped, ok for meds. Will, minimal UOP. Family educated through process, updated about procedure and provided emotional support.    Plan: Monitor bleed. PST tomorrow? ENT will assess swelling. Continue with POC.    Goal Outcome Evaluation:      Plan of Care Reviewed With: patient    Overall Patient Progress: no changeOverall Patient Progress: no change    Outcome Evaluation: OR today, penrose in, drain. Sedated, pain meds. Continue with POC.

## 2023-09-01 NOTE — PROGRESS NOTES
"CLINICAL NUTRITION SERVICES - ASSESSMENT NOTE     Nutrition Prescription    RECOMMENDATIONS FOR MDs/PROVIDERS TO ORDER:  If EN becomes plan of care post op, consult nutrition for TF assess and order    Malnutrition Status:    Patient does not meet two of the established criteria necessary for diagnosing malnutrition    Recommendations already ordered by Registered Dietitian (RD):  None today    Future/Additional Recommendations:  Pending surgical course, if EN becomes plan of care:  -  Osmolite 1.5 Saurabh (or equivalent) @ goal of  40ml/hr  (960ml/day) + 1 pkt prosource TF20 provides: 1520 kcals (28 kcal/kg), 80 g PRO (1.5gm/kg), 731 ml free H20, 195 g CHO, and 0 g fiber daily.  -  Initiate EN @ 20 ml/hr, adv by 10ml q 6 hours to goal  -  HOB 30 degrees with gastric feeds  - FWF 30 ml q 4 hours  - Certavite daily       REASON FOR ASSESSMENT  Dari Hernandez is a/an 63 year old female assessed by the dietitian for Nutrition Risk Monitoring    Pt with recent onset of dental infection that progressed to anterior Jarocho's angina, requiring operative intubation for airway protection earlier today by ENT at [facility]. After intubation she was then transferred  to [facility] SICU on 8/31/23 for OMFS eval.     NUTRITION HISTORY  Plan for I&D today with OMFS  OGT in place, pt intubated    CURRENT NUTRITION ORDERS  Diet: NPO    LABS  Labs reviewed,  with D5 IVFs, one dose of decadron    MEDICATIONS  Medications reviewed and include medium resistance novolog. D5 changed to LR.    ANTHROPOMETRICS  Height: 157.5 cm (5' 2.008\")  Most Recent Weight: 69.9 kg (154 lb 1.6 oz)    IBW: 50 kg  BMI: Overweight BMI 25-29.9  Weight History:    Wt Readings from Last 20 Encounters:   09/01/23 69.9 kg (154 lb 1.6 oz)   08/31/23 70 kg (154 lb 5.2 oz)   04/19/21 67.7 kg (149 lb 4 oz)       Dosing Weight: 55 kg (adjusted using admit weight of 70 kg and IBW of 50 kg)    ASSESSED NUTRITION NEEDS  Estimated Energy Needs: 1375 - 1650 kcals/day (25 - " 30 kcals/kg)  Justification: Maintenance and Vented  Estimated Protein Needs: 83 - 110 grams protein/day (1.5 - 2 grams of pro/kg)  Justification: Increased needs, Post-op, and Wound healing  Estimated Fluid Needs: 1 mL/kcal  Justification: Maintenance    MALNUTRITION  % Intake: Unable to assess  % Weight Loss: None noted  Subcutaneous Fat Loss: None observed - per visual.  Visit with pt deferred d/t multiple family members at bedside.  Muscle Loss: Unable to assess  Fluid Accumulation/Edema: Does not meet criteria  Malnutrition Diagnosis: Patient does not meet two of the established criteria necessary for diagnosing malnutrition    NUTRITION DIAGNOSIS  Inadequate oral intake related to NPO with mechanical ventilation as evidenced by plan for OR today, with OGT in place      INTERVENTIONS  Implementation  Collaboration with other providers - SICU    Goals  Diet adv v nutrition support within 2-3 days.     Monitoring/Evaluation  Progress toward goals will be monitored and evaluated per protocol.  Julee Ahn, RD, LD, CNSC  4E SICU RD pager: [phone]  Ascom: [phone]  Weekend/Holiday RD pager [phone]

## 2023-09-01 NOTE — PLAN OF CARE
Goal Outcome Evaluation:      Plan of Care Reviewed With: other (see comments)SICU    Overall Patient Progress: no changeOverall Patient Progress: no change    Outcome Evaluation: EN recommendations in chart 9/1       Please review and sign orders

## 2023-09-01 NOTE — PROGRESS NOTES
Transferring patient to [facility] for surgery/higher level of care. Report called to DALLAS Liu. Transport being set up. Will notify the U when Transport has left for ETA. Family at bedside.

## 2023-09-01 NOTE — OP NOTE
Oral and Maxillofacial Surgery  Operative Note       Preoperative Diagnosis  Neck abscess [L02.11]    Postoperative Diagnosis  Same    Procedure(s):  Incision and drainage of bilateral sublingual, submandibular, submental spaces  Extraction of teeth #s 22 and 23    Surgeon:  Hemanth Vinson DDS, MD, FACS    Resident Surgeon(s):  David Rondon DDS    Resident Assistants:  Asia Carey DDS    Other surgical staff (if any):  Circulator: Cheng Soriano RN; Reece Echeverria RN  Scrub Person: Clarice Wilson    Anesthesia  Anesthesiologist: Deepak Abdul MD  CRNA: Byron Washington APRN CRNA    EBL:   10 mL    Drains: None    Prosthetic Devices:   * No implants in log *    Specimen Removed:   * No specimens in log *    Complications: none    Indications for Surgery:   Dari is a 62 yo F who was transferred from an OSH for management of bilateral submandibular, sublingual, and submental space abscess. Pt was intubated at the OSH for airway protection. Based on clinical and radiographic findings, the patient was offered incision and drainage of bilateral submandibular, sublingual, and submental space abscess, and extraction of teeth as indicated in an OR setting. The procedure, benefits, risks, alternatives including no treatment were discussed in detail with the patient and the patient elected to proceed with the planned surgery.     Procedure description:   On the day of surgery, the patient was seen by myself and Dr. Vinson in the pre-op holding area.  The procedure, benefits, risks, alternatives including no treatment were discussed again with the patient and an informed written consent was obtained for the planned procedure.  Patient was transported to the operating room and transferred to the OR table in a supine position.  Airway was secured via nasal tube by the anesthesia team.  The patient was prepped and draped in a sterile fashion for the planned procedure.  Patient's care was given to the OMFS team for the  planned procedure.  A surgical timeout was performed by all members of the team.    Intra-oral Incision and Drainage  Left FOM edema, ulceration and purulent drainage observed. An 18 gauge needle was utilized to aspirate the swelling and no purulent aspirate could be obtained. A #15 blade was used to make an elliptical incision around the anterior and posterior margins of the left FOM ulcer. Both specimens were placed in a specimen container and sent for histopathologic review. Culture swabs obtained and sent for aerobic and anaerobic cultures, and gram stain.  A curved hemostat was then utilized to bluntly dissect through the left FOM ulceration down to abscess in the FOM and tongue. Exploration of the left sublingual space with approximately 3 mL of purulent material encountered. Blunt dissection continued with hemostat and finger until no further loculations were identified. Incision irrigated with copious sterile saline. A 5/8 inch penrose drain was introduced to the left FOM/tongue and sutured in place to with 2-0 silk suture with air knot. Tooth #22 was noted to be grossly decayed and fractured to the gingiva, and tooth #23 was noted to be class III mobile. #9 periosteal elevator used to release gingival attachment around teeth #s 22 and 23, which were then luxated, elevated with straight elevators, and delivered with universal forceps and rongeurs. Extraction sites irrigated with copious sterile saline.      Transcervical Incision and Drainage   Inferior border of mandible marked and 1.5cm incision site marked greater than 2cm below the inferior border of mandible in bilateral submandibular and submental regions.  An 18 gauge needle was utilized to aspirate the swelling and no purulent aspirate could be obtained. #15 blade was used to incise through skin and subcutaneous tissue to the depth of Platysma in bilateral submandibular and submental regions. Curved Bernice forceps used to bluntly dissect through  "Platysma and superiorly to the inferior border of the mandible. Upon further blunt dissection scant purulent drainage expressed from bilateral submandibular, sublingual, and submental spaces through cutaneous incisions. Blunt dissection carried further superiorly along the medial and lateral aspects of the bilateral posterior mandible and the anterior mandible. Incision and deep neck spaces flushed with copious sterile saline irrigation. 5/8\" penrose drains placed through cutaneous incisions to the medial and lateral aspects of the mandible (total of 6 extraoral penrose drains). Penrose drains secured with 2-0 silk skin air-knot sutures.     Thus, the planned procedure completed, the patient's care was given back to the anesthesia team for the extubation part. The patient was extubated without any difficulty and transported to the PACU with stable vital signs and breathing spontaneously.     I was told by the OR nurse staff that all needles, sponges, instruments were found to be correct and there were no intraoperative complications noted.     Attending staff was present for the entire duration of the procedure.    David Rondon DDS  OMFS, PGY-4  "

## 2023-09-01 NOTE — PROGRESS NOTES
Admitted/transferred from: [facility]  Reason for admission/transfer: OMFS consult  Patient status upon admission/transfer: Intubated and sedated with propofol. Vitals: T 99.6, HR 70, BP 94/65 MAP 74, RR 12. OG and whitfield in place, and two PIVs.  Interventions: 1 PIV placed, Electrolytes replaced, IVF started, OMFS consult at bedside. BG monitored.   Plan: Possible OMFS surgery today  2 RN skin assessment: completed by Noe RN and Mary Beth RN. Skin intact with scattered bruises to pt right inner thigh. Swelling to the face, jaw and tongue. Abscess noted and ulcer on the left lateral tongue.    Sexual Orientation and Gender Identity (SOGI) smartfom completed: Not Done, Pt intubated , unable to assess  Result of skin assessment and interventions/actions:  Height, weight, drug calc weight: Done  Patient belongings (see Flowsheet - Adult Profile for details): None  MDRO education (if applicable):  NA

## 2023-09-02 ENCOUNTER — APPOINTMENT (OUTPATIENT)
Dept: CT IMAGING | Facility: CLINIC | Age: 63
DRG: 137 | End: 2023-09-02
Attending: SURGERY
Payer: MEDICARE

## 2023-09-02 LAB
ANION GAP SERPL CALCULATED.3IONS-SCNC: 9 MMOL/L (ref 7–15)
BUN SERPL-MCNC: 17.9 MG/DL (ref 8–23)
CALCIUM SERPL-MCNC: 8.4 MG/DL (ref 8.8–10.2)
CHLORIDE SERPL-SCNC: 110 MMOL/L (ref 98–107)
CREAT SERPL-MCNC: 0.62 MG/DL (ref 0.51–0.95)
CREAT SERPL-MCNC: 0.64 MG/DL (ref 0.51–0.95)
DEPRECATED HCO3 PLAS-SCNC: 22 MMOL/L (ref 22–29)
ERYTHROCYTE [DISTWIDTH] IN BLOOD BY AUTOMATED COUNT: 15.4 % (ref 10–15)
GFR SERPL CREATININE-BSD FRML MDRD: >90 ML/MIN/1.73M2
GFR SERPL CREATININE-BSD FRML MDRD: >90 ML/MIN/1.73M2
GLUCOSE BLDC GLUCOMTR-MCNC: 144 MG/DL (ref 70–99)
GLUCOSE BLDC GLUCOMTR-MCNC: 157 MG/DL (ref 70–99)
GLUCOSE BLDC GLUCOMTR-MCNC: 159 MG/DL (ref 70–99)
GLUCOSE BLDC GLUCOMTR-MCNC: 165 MG/DL (ref 70–99)
GLUCOSE BLDC GLUCOMTR-MCNC: 170 MG/DL (ref 70–99)
GLUCOSE BLDC GLUCOMTR-MCNC: 181 MG/DL (ref 70–99)
GLUCOSE BLDC GLUCOMTR-MCNC: 184 MG/DL (ref 70–99)
GLUCOSE SERPL-MCNC: 165 MG/DL (ref 70–99)
HCT VFR BLD AUTO: 38.7 % (ref 35–47)
HGB BLD-MCNC: 12.6 G/DL (ref 11.7–15.7)
HOLD SPECIMEN: NORMAL
MAGNESIUM SERPL-MCNC: 2.1 MG/DL (ref 1.7–2.3)
MCH RBC QN AUTO: 31.7 PG (ref 26.5–33)
MCHC RBC AUTO-ENTMCNC: 32.6 G/DL (ref 31.5–36.5)
MCV RBC AUTO: 98 FL (ref 78–100)
PHOSPHATE SERPL-MCNC: 3.2 MG/DL (ref 2.5–4.5)
PLATELET # BLD AUTO: 132 10E3/UL (ref 150–450)
POTASSIUM SERPL-SCNC: 4.1 MMOL/L (ref 3.4–5.3)
RBC # BLD AUTO: 3.97 10E6/UL (ref 3.8–5.2)
SODIUM SERPL-SCNC: 141 MMOL/L (ref 136–145)
WBC # BLD AUTO: 14.4 10E3/UL (ref 4–11)

## 2023-09-02 PROCEDURE — 250N000013 HC RX MED GY IP 250 OP 250 PS 637

## 2023-09-02 PROCEDURE — 258N000003 HC RX IP 258 OP 636

## 2023-09-02 PROCEDURE — G1010 CDSM STANSON: HCPCS | Mod: GC | Performed by: STUDENT IN AN ORGANIZED HEALTH CARE EDUCATION/TRAINING PROGRAM

## 2023-09-02 PROCEDURE — 999N000157 HC STATISTIC RCP TIME EA 10 MIN

## 2023-09-02 PROCEDURE — 99291 CRITICAL CARE FIRST HOUR: CPT | Mod: 24

## 2023-09-02 PROCEDURE — 70450 CT HEAD/BRAIN W/O DYE: CPT | Mod: 26 | Performed by: STUDENT IN AN ORGANIZED HEALTH CARE EDUCATION/TRAINING PROGRAM

## 2023-09-02 PROCEDURE — 999N000248 HC STATISTIC IV INSERT WITH US BY RN

## 2023-09-02 PROCEDURE — 258N000001 HC RX 258

## 2023-09-02 PROCEDURE — C9113 INJ PANTOPRAZOLE SODIUM, VIA: HCPCS | Mod: JZ | Performed by: INTERNAL MEDICINE

## 2023-09-02 PROCEDURE — 999N000253 HC STATISTIC WEANING TRIALS

## 2023-09-02 PROCEDURE — G1010 CDSM STANSON: HCPCS

## 2023-09-02 PROCEDURE — 250N000013 HC RX MED GY IP 250 OP 250 PS 637: Performed by: DENTIST

## 2023-09-02 PROCEDURE — 36415 COLL VENOUS BLD VENIPUNCTURE: CPT

## 2023-09-02 PROCEDURE — 94003 VENT MGMT INPAT SUBQ DAY: CPT

## 2023-09-02 PROCEDURE — 84100 ASSAY OF PHOSPHORUS: CPT

## 2023-09-02 PROCEDURE — 250N000013 HC RX MED GY IP 250 OP 250 PS 637: Performed by: INTERNAL MEDICINE

## 2023-09-02 PROCEDURE — 250N000011 HC RX IP 250 OP 636: Mod: JZ

## 2023-09-02 PROCEDURE — 250N000011 HC RX IP 250 OP 636: Performed by: DENTIST

## 2023-09-02 PROCEDURE — 200N000002 HC R&B ICU UMMC

## 2023-09-02 PROCEDURE — 83735 ASSAY OF MAGNESIUM: CPT

## 2023-09-02 PROCEDURE — 250N000011 HC RX IP 250 OP 636: Mod: JZ | Performed by: INTERNAL MEDICINE

## 2023-09-02 PROCEDURE — 250N000011 HC RX IP 250 OP 636: Mod: JZ | Performed by: PHARMACIST

## 2023-09-02 PROCEDURE — 85014 HEMATOCRIT: CPT

## 2023-09-02 PROCEDURE — 82310 ASSAY OF CALCIUM: CPT

## 2023-09-02 PROCEDURE — 70491 CT SOFT TISSUE NECK W/DYE: CPT | Mod: 26 | Performed by: STUDENT IN AN ORGANIZED HEALTH CARE EDUCATION/TRAINING PROGRAM

## 2023-09-02 PROCEDURE — 82565 ASSAY OF CREATININE: CPT

## 2023-09-02 RX ORDER — GUAIFENESIN 600 MG/1
15 TABLET, EXTENDED RELEASE ORAL DAILY
Status: DISCONTINUED | OUTPATIENT
Start: 2023-09-02 | End: 2023-09-06 | Stop reason: HOSPADM

## 2023-09-02 RX ORDER — POLYETHYLENE GLYCOL 3350 17 G/17G
17 POWDER, FOR SOLUTION ORAL DAILY
Status: DISCONTINUED | OUTPATIENT
Start: 2023-09-02 | End: 2023-09-03

## 2023-09-02 RX ORDER — DEXTROSE MONOHYDRATE 100 MG/ML
INJECTION, SOLUTION INTRAVENOUS CONTINUOUS PRN
Status: DISCONTINUED | OUTPATIENT
Start: 2023-09-02 | End: 2023-09-06 | Stop reason: HOSPADM

## 2023-09-02 RX ORDER — ACETAMINOPHEN 325 MG/1
975 TABLET ORAL EVERY 8 HOURS
Status: DISCONTINUED | OUTPATIENT
Start: 2023-09-02 | End: 2023-09-06 | Stop reason: HOSPADM

## 2023-09-02 RX ORDER — DEXAMETHASONE SODIUM PHOSPHATE 10 MG/ML
10 INJECTION, SOLUTION INTRAMUSCULAR; INTRAVENOUS EVERY 8 HOURS
Status: COMPLETED | OUTPATIENT
Start: 2023-09-02 | End: 2023-09-02

## 2023-09-02 RX ORDER — IOPAMIDOL 755 MG/ML
100 INJECTION, SOLUTION INTRAVASCULAR ONCE
Status: COMPLETED | OUTPATIENT
Start: 2023-09-02 | End: 2023-09-02

## 2023-09-02 RX ORDER — DEXTROSE MONOHYDRATE, SODIUM CHLORIDE, SODIUM LACTATE, POTASSIUM CHLORIDE, CALCIUM CHLORIDE 5; 600; 310; 179; 20 G/100ML; MG/100ML; MG/100ML; MG/100ML; MG/100ML
INJECTION, SOLUTION INTRAVENOUS CONTINUOUS
Status: DISCONTINUED | OUTPATIENT
Start: 2023-09-02 | End: 2023-09-03

## 2023-09-02 RX ORDER — ENOXAPARIN SODIUM 100 MG/ML
40 INJECTION SUBCUTANEOUS DAILY
Status: DISCONTINUED | OUTPATIENT
Start: 2023-09-02 | End: 2023-09-06 | Stop reason: HOSPADM

## 2023-09-02 RX ORDER — SENNOSIDES 8.6 MG
8.6 TABLET ORAL EVERY EVENING
Status: DISCONTINUED | OUTPATIENT
Start: 2023-09-02 | End: 2023-09-02

## 2023-09-02 RX ORDER — SENNOSIDES 8.6 MG
8.6 TABLET ORAL 2 TIMES DAILY
Status: DISCONTINUED | OUTPATIENT
Start: 2023-09-02 | End: 2023-09-04

## 2023-09-02 RX ADMIN — ENOXAPARIN SODIUM 40 MG: 40 INJECTION SUBCUTANEOUS at 10:25

## 2023-09-02 RX ADMIN — HYDROMORPHONE HYDROCHLORIDE 0.5 MG: 1 INJECTION, SOLUTION INTRAMUSCULAR; INTRAVENOUS; SUBCUTANEOUS at 07:40

## 2023-09-02 RX ADMIN — DEXTROSE MONOHYDRATE, SODIUM CHLORIDE, SODIUM LACTATE, POTASSIUM CHLORIDE, CALCIUM CHLORIDE: 5; 600; 310; 179; 20 INJECTION, SOLUTION INTRAVENOUS at 11:36

## 2023-09-02 RX ADMIN — ACETAMINOPHEN 975 MG: 325 TABLET, FILM COATED ORAL at 16:05

## 2023-09-02 RX ADMIN — DEXAMETHASONE SODIUM PHOSPHATE 10 MG: 10 INJECTION, SOLUTION INTRAMUSCULAR; INTRAVENOUS at 00:05

## 2023-09-02 RX ADMIN — ACETAMINOPHEN 975 MG: 325 TABLET, FILM COATED ORAL at 00:05

## 2023-09-02 RX ADMIN — DEXAMETHASONE SODIUM PHOSPHATE 10 MG: 10 INJECTION, SOLUTION INTRAMUSCULAR; INTRAVENOUS at 07:40

## 2023-09-02 RX ADMIN — POLYETHYLENE GLYCOL 3350 17 G: 17 POWDER, FOR SOLUTION ORAL at 10:25

## 2023-09-02 RX ADMIN — PROPOFOL 30 MCG/KG/MIN: 10 INJECTION, EMULSION INTRAVENOUS at 04:05

## 2023-09-02 RX ADMIN — INSULIN ASPART 1 UNITS: 100 INJECTION, SOLUTION INTRAVENOUS; SUBCUTANEOUS at 04:05

## 2023-09-02 RX ADMIN — INSULIN ASPART 1 UNITS: 100 INJECTION, SOLUTION INTRAVENOUS; SUBCUTANEOUS at 19:48

## 2023-09-02 RX ADMIN — HYDROMORPHONE HYDROCHLORIDE 0.5 MG: 1 INJECTION, SOLUTION INTRAMUSCULAR; INTRAVENOUS; SUBCUTANEOUS at 12:34

## 2023-09-02 RX ADMIN — CHLORHEXIDINE GLUCONATE 15 ML: 1.2 SOLUTION ORAL at 07:40

## 2023-09-02 RX ADMIN — PANTOPRAZOLE SODIUM 40 MG: 40 INJECTION, POWDER, FOR SOLUTION INTRAVENOUS at 07:40

## 2023-09-02 RX ADMIN — INSULIN ASPART 1 UNITS: 100 INJECTION, SOLUTION INTRAVENOUS; SUBCUTANEOUS at 11:35

## 2023-09-02 RX ADMIN — ACETAMINOPHEN 975 MG: 325 TABLET, FILM COATED ORAL at 23:50

## 2023-09-02 RX ADMIN — IOPAMIDOL 100 ML: 755 INJECTION, SOLUTION INTRAVENOUS at 11:12

## 2023-09-02 RX ADMIN — PROPOFOL 10 MCG/KG/MIN: 10 INJECTION, EMULSION INTRAVENOUS at 22:12

## 2023-09-02 RX ADMIN — CHLORHEXIDINE GLUCONATE 15 ML: 1.2 SOLUTION ORAL at 19:48

## 2023-09-02 RX ADMIN — AMPICILLIN SODIUM AND SULBACTAM SODIUM 3 G: 2; 1 INJECTION, POWDER, FOR SOLUTION INTRAMUSCULAR; INTRAVENOUS at 04:05

## 2023-09-02 RX ADMIN — SENNOSIDES 8.6 MG: 8.6 TABLET, COATED ORAL at 12:34

## 2023-09-02 RX ADMIN — AMPICILLIN SODIUM AND SULBACTAM SODIUM 3 G: 2; 1 INJECTION, POWDER, FOR SOLUTION INTRAMUSCULAR; INTRAVENOUS at 22:41

## 2023-09-02 RX ADMIN — SODIUM CHLORIDE, POTASSIUM CHLORIDE, SODIUM LACTATE AND CALCIUM CHLORIDE: 600; 310; 30; 20 INJECTION, SOLUTION INTRAVENOUS at 04:14

## 2023-09-02 RX ADMIN — INSULIN ASPART 2 UNITS: 100 INJECTION, SOLUTION INTRAVENOUS; SUBCUTANEOUS at 16:17

## 2023-09-02 RX ADMIN — Medication 15 ML: at 10:25

## 2023-09-02 RX ADMIN — SENNOSIDES 8.6 MG: 8.6 TABLET, COATED ORAL at 19:48

## 2023-09-02 RX ADMIN — AMPICILLIN SODIUM AND SULBACTAM SODIUM 3 G: 2; 1 INJECTION, POWDER, FOR SOLUTION INTRAMUSCULAR; INTRAVENOUS at 16:04

## 2023-09-02 RX ADMIN — AMPICILLIN SODIUM AND SULBACTAM SODIUM 3 G: 2; 1 INJECTION, POWDER, FOR SOLUTION INTRAMUSCULAR; INTRAVENOUS at 10:25

## 2023-09-02 RX ADMIN — INSULIN ASPART 1 UNITS: 100 INJECTION, SOLUTION INTRAVENOUS; SUBCUTANEOUS at 00:05

## 2023-09-02 RX ADMIN — ACETAMINOPHEN 975 MG: 325 TABLET, FILM COATED ORAL at 07:40

## 2023-09-02 RX ADMIN — INSULIN ASPART 1 UNITS: 100 INJECTION, SOLUTION INTRAVENOUS; SUBCUTANEOUS at 23:50

## 2023-09-02 RX ADMIN — DEXAMETHASONE SODIUM PHOSPHATE 10 MG: 10 INJECTION INTRAMUSCULAR; INTRAVENOUS at 16:05

## 2023-09-02 RX ADMIN — HYDROMORPHONE HYDROCHLORIDE 0.5 MG: 1 INJECTION, SOLUTION INTRAMUSCULAR; INTRAVENOUS; SUBCUTANEOUS at 13:21

## 2023-09-02 RX ADMIN — INSULIN ASPART 1 UNITS: 100 INJECTION, SOLUTION INTRAVENOUS; SUBCUTANEOUS at 08:06

## 2023-09-02 ASSESSMENT — ACTIVITIES OF DAILY LIVING (ADL)
ADLS_ACUITY_SCORE: 34
ADLS_ACUITY_SCORE: 30

## 2023-09-02 NOTE — PLAN OF CARE
"ICU End of Shift Summary. See flowsheets for vital signs and detailed assessment.    Changes this shift: [id number]: Alert and calm, patient nods yes/no, makes needs know, follows commands and tracks. Family advocating for cares and asking questions at bedside. Hmong (\"Kerline\") speaking, please use Hmong  to explain cares, plans and how patient is doing. Weaned off Prop at 1200, patient tolerated well. PST 5/5, 30% for 50 mins, patient having low MV, continue to trial as tolerated. Per team, no plans to extubate, ok to exercise lungs, caution with ETT considering with neck tissue swelling. TF to be started at 20/hr, no advancing TF till further notice from SICU, stop IV fluids when start TF. Whitfield, decent UOP, continue to monitor, remove whitfield? Weight up, net positive, talked about it at rounds today, decreased IVF, at this time, team will monitor, no other interventions needed. CT head/neck to assess status s/p drainage in the OR yesterday. Hgb drop 14.3 yesterday to 12.6 today, team notified at rounds, no interventions or rechecks needed, monitor bleed. Neck dressings changed due to moderate saturation at 0800, dressing changed PRN per ENT team. No activity recommended by team due to caution with ETT considering with neck tissue swelling, high risk. Assist x1-2, patient helps turns.     Plan: Monitor airway, PST as tolerated. Remove whitfield? Start TF 20/hr. BM? Monitor swelling and monitor for bleed. Continue with POC.     Transferred to:  at 1245  Status at time of transfer: Stable, vitals monitor and vent  Belongings: Remains with patient  Whitfield removed? (if no, why?): No, notify SICU if appropriate to remove  Chart and medications: Transferred with patient.   Family notified: Spouse at bedside, he stated he will update family    Goal Outcome Evaluation:      Plan of Care Reviewed With: patient, spouse    Overall Patient Progress: no changeOverall Patient Progress: no change    Outcome Evaluation: " Penrose drains in place, patient stable, wean sedation, Control pain, monitor.

## 2023-09-02 NOTE — PHARMACY-CONSULT NOTE
Pharmacy Tube Feeding Consult    Medication reviewed for administration by feeding tube and for potential food/drug interactions.    Recommendation: No changes are needed at this time.     Pharmacy will continue to follow as new medications are ordered.    Hailey Barajas RPH on 9/2/2023 at 10:15 AM

## 2023-09-02 NOTE — PLAN OF CARE
Problem: Plan of Care - These are the overarching goals to be used throughout the patient stay.    Goal: Plan of Care Review  Description: The Plan of Care Review/Shift note should be completed every shift.  The Outcome Evaluation is a brief statement about your assessment that the patient is improving, declining, or no change.  This information will be displayed automatically on your shift note.  Outcome: Progressing   Goal Outcome Evaluation:         FRENCH Rico  used to explain cares-- whitfield discontinued-  will hold up bedpan when needs to void-  plan of care reviewed with -  I whitfield removed via female nurse- ( questions about extubation reviewed )  with   P cont to monitor suctions mouth via self -               Problem: MOBILITY - ADULT  Goal: Maintain or return to baseline ADL function  Description: INTERVENTIONS:  -  Assess patient's ability to carry out ADLs; assess patient's baseline for ADL function and identify physical deficits which impact ability to perform ADLs (bathing, care of mouth/teeth, toileting, grooming, dressing, etc )  - Assess/evaluate cause of self-care deficits   - Assess range of motion  - Assess patient's mobility; develop plan if impaired  - Assess patient's need for assistive devices and provide as appropriate  - Encourage maximum independence but intervene and supervise when necessary  - Involve family in performance of ADLs  - Assess for home care needs following discharge   - Consider OT consult to assist with ADL evaluation and planning for discharge  - Provide patient education as appropriate  Outcome: Progressing  Goal: Maintains/Returns to pre admission functional level  Description: INTERVENTIONS:  - Perform BMAT or MOVE assessment daily    - Set and communicate daily mobility goal to care team and patient/family/caregiver  - Collaborate with rehabilitation services on mobility goals if consulted  - Perform Range of Motion  times a day  - Reposition patient every  hours    - Dangle patient  times a day  - Stand patient  times a day  - Ambulate patient  times a day  - Out of bed to chair  times a day   - Out of bed for meals  times a day  - Out of bed for toileting  - Record patient progress and toleration of activity level   Outcome: Progressing     Problem: Potential for Falls  Goal: Patient will remain free of falls  Description: INTERVENTIONS:  - Educate patient/family on patient safety including physical limitations  - Instruct patient to call for assistance with activity   - Consult OT/PT to assist with strengthening/mobility   - Keep Call bell within reach  - Keep bed low and locked with side rails adjusted as appropriate  - Keep care items and personal belongings within reach  - Initiate and maintain comfort rounds  - Make Fall Risk Sign visible to staff  - Offer Toileting every  Hours, in advance of need  - Initiate/Maintain alarm  - Obtain necessary fall risk management equipment:   - Apply yellow socks and bracelet for high fall risk patients  - Consider moving patient to room near nurses station  Outcome: Progressing     Problem: PAIN - ADULT  Goal: Verbalizes/displays adequate comfort level or baseline comfort level  Description: Interventions:  - Encourage patient to monitor pain and request assistance  - Assess pain using appropriate pain scale  - Administer analgesics based on type and severity of pain and evaluate response  - Implement non-pharmacological measures as appropriate and evaluate response  - Consider cultural and social influences on pain and pain management  - Notify physician/advanced practitioner if interventions unsuccessful or patient reports new pain  Outcome: Progressing     Problem: INFECTION - ADULT  Goal: Absence or prevention of progression during hospitalization  Description: INTERVENTIONS:  - Assess and monitor for signs and symptoms of infection  - Monitor lab/diagnostic results  - Monitor all insertion sites, i e  indwelling lines, tubes, and drains  - Monitor endotracheal if appropriate and nasal secretions for changes in amount and color  - [place] appropriate cooling/warming therapies per order  - Administer medications as ordered  - Instruct and encourage patient and family to use good hand hygiene technique  - Identify and instruct in appropriate isolation precautions for identified infection/condition  Outcome: Progressing  Goal: Absence of fever/infection during neutropenic period  Description: INTERVENTIONS:  - Monitor WBC    Outcome: Progressing     Problem: SAFETY ADULT  Goal: Maintain or return to baseline ADL function  Description: INTERVENTIONS:  -  Assess patient's ability to carry out ADLs; assess patient's baseline for ADL function and identify physical deficits which impact ability to perform ADLs (bathing, care of mouth/teeth, toileting, grooming, dressing, etc )  - Assess/evaluate cause of self-care deficits   - Assess range of motion  - Assess patient's mobility; develop plan if impaired  - Assess patient's need for assistive devices and provide as appropriate  - Encourage maximum independence but intervene and supervise when necessary  - Involve family in performance of ADLs  - Assess for home care needs following discharge   - Consider OT consult to assist with ADL evaluation and planning for discharge  - Provide patient education as appropriate  Outcome: Progressing  Goal: Maintains/Returns to pre admission functional level  Description: INTERVENTIONS:  - Perform BMAT or MOVE assessment daily    - Set and communicate daily mobility goal to care team and patient/family/caregiver  - Collaborate with rehabilitation services on mobility goals if consulted  - Perform Range of Motion  times a day  - Reposition patient every  hours    - Dangle patient  times a day  - Stand patient  times a day  - Ambulate patient  times a day  - Out of bed to chair  times a day   - Out of bed for meals  times a day  - Out of bed for toileting  - Record patient progress and toleration of activity level   Outcome: Progressing  Goal: Patient will remain free of falls  Description: INTERVENTIONS:  - Educate patient/family on patient safety including physical limitations  - Instruct patient to call for assistance with activity   - Consult OT/PT to assist with strengthening/mobility   - Keep Call bell within reach  - Keep bed low and locked with side rails adjusted as appropriate  - Keep care items and personal belongings within reach  - Initiate and maintain comfort rounds  - Make Fall Risk Sign visible to staff  - Offer Toileting every  Hours, in advance of need  - Initiate/Maintain alarm  - Obtain necessary fall risk management equipment:  - Apply yellow socks and bracelet for high fall risk patients  - Consider moving patient to room near nurses station  Outcome: Progressing     Problem: DISCHARGE PLANNING  Goal: Discharge to home or other facility with appropriate resources  Description: INTERVENTIONS:  - Identify barriers to discharge w/patient and caregiver  - Arrange for needed discharge resources and transportation as appropriate  - Identify discharge learning needs (meds, wound care, etc )  - Arrange for interpretive services to assist at discharge as needed  - Refer to Case Management Department for coordinating discharge planning if the patient needs post-hospital services based on physician/advanced practitioner order or complex needs related to functional status, cognitive ability, or social support system  Outcome: Progressing     Problem: Knowledge Deficit  Goal: Patient/family/caregiver demonstrates understanding of disease process, treatment plan, medications, and discharge instructions  Description: Complete learning assessment and assess knowledge base    Interventions:  - Provide teaching at level of understanding  - Provide teaching via preferred learning methods  Outcome: Progressing     Problem: CARDIOVASCULAR - ADULT  Goal: Maintains optimal cardiac output and hemodynamic stability  Description: INTERVENTIONS:  - Monitor I/O, vital signs and rhythm  - Monitor for S/S and trends of decreased cardiac output  - Administer and titrate ordered vasoactive medications to optimize hemodynamic stability  - Assess quality of pulses, skin color and temperature  - Assess for signs of decreased coronary artery perfusion  - Instruct patient to report change in severity of symptoms  Outcome: Progressing  Goal: Absence of cardiac dysrhythmias or at baseline rhythm  Description: INTERVENTIONS:  - Continuous cardiac monitoring, vital signs, obtain 12 lead EKG if ordered  - Administer antiarrhythmic and heart rate control medications as ordered  - Monitor electrolytes and administer replacement therapy as ordered  Outcome: Progressing

## 2023-09-02 NOTE — PROGRESS NOTES
SURGICAL ICU PROGRESS NOTE  09/02/2023        Date of Service (when I saw the patient): 09/02/2023    ASSESSMENT:  Dari Hernandez is a 63-year-old woman with recent onset of dental infection that progressed to anterior Jarocho's angina, requiring operative intubation for airway protection earlier today by ENT at [facility]. After intubation she was then transferred  to [facility] SICU on 8/31/23 for OMFS eval. Pt underwent I&D and extraction of teeth 22 and 23 on 9/1. Remains intubated in SICU for ongoing monitoring.     CHANGES and MAJOR THINGS TODAY:   - Start trophic feeds, stop MIV  - Repeat Head/neck CT with contrast    PLAN:    Neurological:  # Acute pain   # Agitation   - Monitor neurological status. Delirium preventions and precautions.  - Pain: Hydromorphone PRN, APAP Q8H  - Sedation: Propofol gtt  - RASS goal 0 to -1    HEENT:  # C/f Jarocho's Angina in the setting of recent dental infection  # S/p I&D and removal of teeth # 22 and #23 9/1/23  - OMFS following, appreciate management.  - Complete decadron x24 hours  - Ongoing Amp-Sulbactam for IV ABX  - Penrose drain x7 in place.    Pulmonary:  # Post operative ventilatory support    # C/f airway edema  Vent Mode: CMV/AC  (Continuous Mandatory Ventilation/ Assist Control)  FiO2 (%): 30 %  Resp Rate (Set): 12 breaths/min  Tidal Volume (Set, mL): 400 mL  PEEP (cm H2O): 5 cmH2O  Resp: 12  - Emergently intubated at [facility] with c/f airway given ongoing infection, now s/p I&D  - Ventilatory bundle, HOB elevation for VAP prevention.   - Pressure support and ventilator weaning when meets criteria      - Okay to PST, no plans for extubation at this time.  - Titrate oxygen to keep saturation above 92 %.    Cardiovascular:    # No acute concerns  - Monitor hemodynamic status.   - Pressors: not currently requiring    GI/Nutrition:    # c/f nutrition status  - No indication for parenteral nutrition.  - OG in place, okay for meds  - Bowel reg ordered  - Nutrition consult for  trophic feeds today.      Renal/Fluids/Electrolytes:  # Volume status  - D5LR @ 50 for IV fluid hydration -- plan to stop when start enteral feeds.  - Electrolyte replacement protocol in place.   - Urine output adequate . Will continue to monitor intake and output.  - I&O: +~3L/ 24H, on minimal vent settings. Will decrease IVF with plan to start enteral and stop. Will monitor volume status. Goal euvolemic +/- 500 mL. No diuresis at this time.    Endocrine:  # Stress hyperglycemia   - Sliding scale for glucose management. Goal to keep BG < 180 for optimal wound healing   - -165 over last 24 hours    Infectious disease:   # c/f Jarocho's angina  - I&D 9/1  - Cultures pending, initial results gram + cocci  - Trend CBC and fever curve    - Antibiotics:  - Unasyn       Hematology:    # No acute concerns  - Hemoglobin 12.6 this AM from 14.3 yesterday, likely dilutional. Monitor and trend. Threshold for transfusion if hgb <7.0 or signs/symptoms of hypoperfusion.       Musculoskeletal:  # Weakness and deconditioning of critical illness    - Physical and occupational therapy consults.    Skin:  # Surgical drains  - 7 Penrose drains in place around neck, fluffs in place.  - Dilgent cares to prevent skin breakdown and wound formation.      General Cares/Prophylaxis:    DVT Prophylaxis: Pneumatic Compression Devices. Start enoxaparin (okayed with OMFS team).  GI Prophylaxis: PPI  Restraints: Restraints for medical healing needed: YES    Lines/ tubes/ drains:  - PIV x2  - OG  - Will  - Penrose x7  - ETT    Disposition:  - Surgical ICU     Patient seen, findings and plan discussed with surgical ICU staff, Dr. Cardenas.    Time spent on this Encounter   Billing:  I spent 45 minutes bedside and on the inpatient unit today managing the critical care of Dari Hernandez in relation to the issues listed in this note.      MART Pelaez CNP    ====================================  INTERVAL HISTORY:   Overall course reviewed.  Patient intubated, sedated. Patient evaluated at bedside. Utilized  via phone to update patient's spouse and son on plan of care for day. Family would like to see ETT removed as soon as possible. Spouse concerned about nutrition status.    OBJECTIVE:   1. VITAL SIGNS:   Temp:  [98  F (36.7  C)-99.6  F (37.6  C)] 98.7  F (37.1  C)  Pulse:  [53-68] 53  Resp:  [11-20] 12  BP: ()/(58-71) 99/61  FiO2 (%):  [30 %] 30 %  SpO2:  [97 %-100 %] 98 %  Vent Mode: CMV/AC  (Continuous Mandatory Ventilation/ Assist Control)  FiO2 (%): 30 %  Resp Rate (Set): 12 breaths/min  Tidal Volume (Set, mL): 400 mL  PEEP (cm H2O): 5 cmH2O  Resp: 12      2. INTAKE/ OUTPUT:   I/O last 3 completed shifts:  In: 3714.93 [I.V.:3569.93; NG/GT:145]  Out: 945 [Urine:945]    3. PHYSICAL EXAMINATION:  General: Laying in bed, awake while orally intubated.  HEENT: penrose drain x7, wrapped in fluffs with serosang/purulent drainage noted.  Neuro: Sedated, but looking around room. RASS -1, NAD  Pulm/Resp: Clear breath sounds bilaterally without rhonchi, crackles or wheeze, breathing non-labored on mechanical ventilation  CV: RRR  Abdomen: Soft, non-distended, non-tender, no rebound tenderness or guarding, no masses  :  whitfield catheter in place, urine yellow and clear  Incisions/Skin: skin intact  MSK/Extremities: trace peripheral edema, moving all extremities, peripheral pulses intact, calves, extremities well perfused    4. INVESTIGATIONS:   Arterial Blood Gases   Recent Labs   Lab 09/01/23  0114 08/31/23 2005   PH 7.44 7.46*   PCO2 37 35   PO2 95 125*   HCO3 25 25     Complete Blood Count   Recent Labs   Lab 09/02/23  0619 09/01/23  0026 08/31/23  1608   WBC 14.4* 19.3* 17.4*   HGB 12.6 14.3 16.6*   * 152 176     Basic Metabolic Panel  Recent Labs   Lab 09/02/23  0404 09/02/23  0004 09/01/23 2001 09/01/23  1621 09/01/23  0435 09/01/23  0026 08/31/23  1847 08/31/23  1625 08/31/23  1608   NA  --   --   --   --   --  144  --   --   142   POTASSIUM  --   --   --   --   --  3.8  --   --  4.0   CHLORIDE  --   --   --   --   --  109*  --   --  103   CO2  --   --   --   --   --  23  --   --  26   BUN  --   --   --   --   --  10.1  --   --  10.5   CR  --   --   --   --   --  0.61  --  0.6 0.70   * 159* 129* 122*   < > 163*   < >  --  123*    < > = values in this interval not displayed.     Liver Function Tests  Recent Labs   Lab 09/01/23 0026 08/31/23  1608   AST 20 28   ALT 34 49   ALKPHOS 82 99   BILITOTAL 0.8 1.7*   ALBUMIN 3.2* 4.0   INR 1.19*  --      Pancreatic Enzymes  No lab results found in last 7 days.  Coagulation Profile  Recent Labs   Lab 09/01/23 0026   INR 1.19*   PTT 30         5. RADIOLOGY:   No results found for this or any previous visit (from the past 24 hour(s)).    =========================================

## 2023-09-02 NOTE — PLAN OF CARE
ICU End of Shift Summary. See flowsheets for vital signs and detailed assessment.    Changes this shift: Patient intubated and sedated with propofol, weaning as tolerates, meeting RASS goal -1. Calm and cooperative, following commands. Hmong  utilized. Vent on CMV settings. Frequent oral cares and suctioning required, mouth and tongue remain swollen. Dressings to penrose drains reinforced. Will in place for strict I&O. No BM overnight, bowel regimen started. LR infusing at 100mL/hr.     Plan: Continue to monitor closely. Notify primary team with changes.     Joyce Gilbert, RN on 9/2/2023 at 6:31 AM

## 2023-09-02 NOTE — PROGRESS NOTES
CLINICAL NUTRITION SERVICES - Brief  NOTE     Nutrition Prescription     RECOMMENDATIONS FOR MDs/PROVIDERS TO ORDER:  1. Glycemic control with TF support   2. Lyte monitoring with TF advancement  3. Fluids and bowel regimen per team      Recommendations already ordered by Registered Dietitian (RD):  1. Enteral Nutrition support recommendation per RD note on :   - Access: OG tube  - Dosing wt: 55 kg adjusted wt     - EN: Once FT placement is confirmed by XR, begin TF with Osmolite 1.5, Initiate @ 20 ml/hr and advance by 10 ml Q 6 hours as tolerated to goal @ 40 ml/hr.  Do not start or advance unless K+ >3.0, Mg++ > 1.5,  and Phos > 1.9.     Osmolite 1.5 or equivalent) @ goal 40 ml/hr (960ml/day) provides: 1440 kcals (26 kcal/kg), 60 g PRO (1.1 gm/kg), 731 ml free H20, 195 g CHO, and 0 g fiber daily.      Modules:  Prosource TF20 protein supplement: 1 packet per day  - TF + Prosource: 1520 kcal/day ( 28 kcal/kg) + 80 gm protein/day ( 1.5 gm/kg).      - HOB 30 degrees with gastric feeds  - FWF 30 ml q 4 hours  - Certavite 15 ml/day via FT.   - K+, Mg, Phos ordered until TF advances to goal rate for evaluation of refeeding         Provider consult received: Registered Dietitian to order TF per Medical Nutrition Therapy Guidelines      Chart reviewed:   Patient was assessed yesterday. Please refer to the RD note for details. Post op patient, intubated/sedated with propofol (40 ml/hr, 1056 kcal/day), not on pressors     Nutrition:   - NPO post op   - Has OGT post op, clamped      IVF:   - D5 @ 100 ml/hr -> 120 gm dext     Lytes:   K+: 4.1, Mg++:2.1, Phos: 3.2  BUN: 17.9, Cr: 0.62  B (H) -> per chart, stress hyperglycemia , steroid on sliding scale insulin   Na+: 141     GI:   No BM since admit  ( 3 days) -> started on bowel regimen today        Pilo Hurley RD/LD  Weekend Pager: [phone]

## 2023-09-02 NOTE — PROGRESS NOTES
RN CC updated patients PCP as reviewed and confirmed with patients  today at bedside.     MEGAN Arias, GUTIERREZN., RN  Nurse Care Coordinator  Pager: [phone] - RNCC [facility]  Social Work and Care Management Department   [address] [place] [id number]  [email or web address]  Employed by [facility]     SEARCHABLE in [employer] - search CARE COORDINATOR     [employer] (0800-1630) Saturday & Sunday; (0800-1630) [facility] Recognized Holidays     Units: 4A, 4C, 4E, 5A & 5B   Pager: [phone]    Units: 6A & 6B    Pager: [phone]

## 2023-09-02 NOTE — PROGRESS NOTES
.ORAL & MAXILLOFACIAL SURGERY   PROGRESS NOTE  Dari Hernandez  MRN: [id number],  : 1960           ASSESSMENT:  63 year old female s/p POD1 of an Incision and drainage of bilateral sublingual, submandibular, submental spaces and extraction of teeth #s 22 and 23 on 23. 7 penrose drains were placed. Moderate mixed-serous output from drains with persistent post-operative edema      PLAN:  No additional surgical interventions planned from OMFS perspective at this time.   Nurse to change fluffs on neck dressing once saturated. Expect moderate serosanguinous / purulent drainage from neck incision for a few days  Continue IV Unasyn  Continue Decadron for 2 hours  HOB >30   Mild oozing from dental extraction sites is expected, ok to place moist gauze with pressure to aid in hemostasis.   Culture pending   Heat to face, NO ICE  Ok for routine oral care  Ok for oral suction  All 7 penrose drains will likely be in place for two to five days pending clinical improvement  Prior to extubation, obtain CT neck with contrast scan  OMFS will follow while inpatient. Upon discharge, the patient will follow closely within clinic     Discussed with resident Dr. Gerardo Burch DDANNE Hebert, DMD  Oral & Maxillofacial Surgery, Intern      Please contact the OMFS resident on-call with questions or concerns.  ____________________________________      SUBJECTIVE:  Patient continues to be intubated and sedated.       PHYSICAL EXAM:   Vitals:    23 0300 23 0400 23 0500 23 0600   BP: (!) 89/62 92/58 109/70 99/61   BP Location:  Left arm     Pulse: 66 56 61 53   Resp: 14 12 14 12   Temp:  98.7  F (37.1  C)     TempSrc:  Axillary     SpO2: 98% 98% 97% 98%   Weight:       Height:            General: Laying in bed, in no acute distress, intubated  HEENT: ETT, OGT in place,   Left sided erythema which was firm and warm, Left neck incision with all 7 penrose drains were in place, with mild to moderate seropurulent  drainage. Inferior border of the mandible is palpable on both the right and left. Little to no drainage on the right side. But moderate drainage on the left. Patient is tolerating secretions.   Intraoral: Left mandibular extraction sockets of #22 & 23 are hemostatic with sutures still in place. Healing as expected. Tongue and floor of the mouth still edematous, but softer compared to prior to surgery. Tongue with mild to moderate purulent sanguinous discharge. Generalized poor dentition.   Neuro: No acute distress, awakens to verbal stimulations, opens/squeezes eyes closed, follows commands to move all extremities  Pulm/Resp: Mechanically vented   CV: RRR  Incisions/Skin: Surgical site with 7 penrose drains, active drainage, covered with gauze and wrap with slight sanguinous staining, site clean, dry and intact.  MSK/Extremities: moving all extremities, peripheral pulses intact, calves soft, extremities warm and well perfuse    LABS:   CBC RESULTS:   Recent Labs   Lab Test 09/02/23  0619   WBC 14.4*   RBC 3.97   HGB 12.6   HCT 38.7   MCV 98   MCH 31.7   MCHC 32.6   RDW 15.4*   *      Last Comprehensive Metabolic Panel:  Sodium   Date Value Ref Range Status   09/02/2023 141 136 - 145 mmol/L Final     Potassium   Date Value Ref Range Status   09/02/2023 4.1 3.4 - 5.3 mmol/L Final     Chloride   Date Value Ref Range Status   09/02/2023 110 (H) 98 - 107 mmol/L Final     Carbon Dioxide (CO2)   Date Value Ref Range Status   09/02/2023 22 22 - 29 mmol/L Final     Anion Gap   Date Value Ref Range Status   09/02/2023 9 7 - 15 mmol/L Final     Glucose   Date Value Ref Range Status   09/02/2023 165 (H) 70 - 99 mg/dL Final     GLUCOSE BY METER POCT   Date Value Ref Range Status   09/02/2023 165 (H) 70 - 99 mg/dL Final     Urea Nitrogen   Date Value Ref Range Status   09/02/2023 17.9 8.0 - 23.0 mg/dL Final     Creatinine   Date Value Ref Range Status   09/02/2023 0.62 0.51 - 0.95 mg/dL Final     GFR Estimate   Date Value  Ref Range Status   09/02/2023 >90 >60 mL/min/1.73m2 Final     GFR, ESTIMATED POCT   Date Value Ref Range Status   08/31/2023 >60 >60 mL/min/1.73m2 Final     Calcium   Date Value Ref Range Status   09/02/2023 8.4 (L) 8.8 - 10.2 mg/dL Final        RADIOLOGY:  Narrative & Impression   XR CHEST PORT 1 VIEW  9/1/2023 1:59 AM      HISTORY:  ETT retracted        COMPARISON:  8/31/2023     FINDINGS:   Frontal view of the chest. Patient is rotated. Interval retraction of  the endotracheal tube with tip projecting 2.7 cm above the brissa.  Partially visualized enteric tube.      Stable enlarged cardiac silhouette. Decreased left  basilar/retrocardiac opacities. Continued blunting of the left  costophrenic angle. No appreciable pneumothorax.                                                                      IMPRESSION:   1. Interval retraction of the endotracheal tube with tip projecting  2.7 cm above the brissa.  2. Improved left effusion and left lower lobe atelectasis.     I have personally reviewed the examination and initial interpretation  and I agree with the findings.     DANTE WALKER MD

## 2023-09-03 LAB
ANION GAP SERPL CALCULATED.3IONS-SCNC: 10 MMOL/L (ref 7–15)
BACTERIA ABSC ANAEROBE+AEROBE CULT: NORMAL
BUN SERPL-MCNC: 22.7 MG/DL (ref 8–23)
CALCIUM SERPL-MCNC: 8.2 MG/DL (ref 8.8–10.2)
CHLORIDE SERPL-SCNC: 112 MMOL/L (ref 98–107)
CREAT SERPL-MCNC: 0.59 MG/DL (ref 0.51–0.95)
DEPRECATED HCO3 PLAS-SCNC: 21 MMOL/L (ref 22–29)
ERYTHROCYTE [DISTWIDTH] IN BLOOD BY AUTOMATED COUNT: 15 % (ref 10–15)
GFR SERPL CREATININE-BSD FRML MDRD: >90 ML/MIN/1.73M2
GLUCOSE BLDC GLUCOMTR-MCNC: 132 MG/DL (ref 70–99)
GLUCOSE BLDC GLUCOMTR-MCNC: 161 MG/DL (ref 70–99)
GLUCOSE BLDC GLUCOMTR-MCNC: 182 MG/DL (ref 70–99)
GLUCOSE BLDC GLUCOMTR-MCNC: 189 MG/DL (ref 70–99)
GLUCOSE BLDC GLUCOMTR-MCNC: 190 MG/DL (ref 70–99)
GLUCOSE SERPL-MCNC: 186 MG/DL (ref 70–99)
HCT VFR BLD AUTO: 38.3 % (ref 35–47)
HGB BLD-MCNC: 12.7 G/DL (ref 11.7–15.7)
MAGNESIUM SERPL-MCNC: 2.1 MG/DL (ref 1.7–2.3)
MCH RBC QN AUTO: 31.8 PG (ref 26.5–33)
MCHC RBC AUTO-ENTMCNC: 33.2 G/DL (ref 31.5–36.5)
MCV RBC AUTO: 96 FL (ref 78–100)
PHOSPHATE SERPL-MCNC: 2.8 MG/DL (ref 2.5–4.5)
PLATELET # BLD AUTO: 151 10E3/UL (ref 150–450)
POTASSIUM SERPL-SCNC: 3.9 MMOL/L (ref 3.4–5.3)
RBC # BLD AUTO: 4 10E6/UL (ref 3.8–5.2)
SODIUM SERPL-SCNC: 143 MMOL/L (ref 136–145)
WBC # BLD AUTO: 14.4 10E3/UL (ref 4–11)

## 2023-09-03 PROCEDURE — 250N000013 HC RX MED GY IP 250 OP 250 PS 637: Performed by: INTERNAL MEDICINE

## 2023-09-03 PROCEDURE — 250N000013 HC RX MED GY IP 250 OP 250 PS 637

## 2023-09-03 PROCEDURE — 84100 ASSAY OF PHOSPHORUS: CPT

## 2023-09-03 PROCEDURE — 83735 ASSAY OF MAGNESIUM: CPT

## 2023-09-03 PROCEDURE — 94003 VENT MGMT INPAT SUBQ DAY: CPT

## 2023-09-03 PROCEDURE — 250N000011 HC RX IP 250 OP 636: Mod: JZ

## 2023-09-03 PROCEDURE — 99232 SBSQ HOSP IP/OBS MODERATE 35: CPT | Mod: 24

## 2023-09-03 PROCEDURE — 36415 COLL VENOUS BLD VENIPUNCTURE: CPT

## 2023-09-03 PROCEDURE — 999N000157 HC STATISTIC RCP TIME EA 10 MIN

## 2023-09-03 PROCEDURE — 250N000013 HC RX MED GY IP 250 OP 250 PS 637: Performed by: DENTIST

## 2023-09-03 PROCEDURE — 80048 BASIC METABOLIC PNL TOTAL CA: CPT

## 2023-09-03 PROCEDURE — 250N000009 HC RX 250

## 2023-09-03 PROCEDURE — 999N000253 HC STATISTIC WEANING TRIALS

## 2023-09-03 PROCEDURE — 250N000011 HC RX IP 250 OP 636: Performed by: INTERNAL MEDICINE

## 2023-09-03 PROCEDURE — 85027 COMPLETE CBC AUTOMATED: CPT

## 2023-09-03 PROCEDURE — 200N000002 HC R&B ICU UMMC

## 2023-09-03 RX ORDER — ONDANSETRON 2 MG/ML
4 INJECTION INTRAMUSCULAR; INTRAVENOUS EVERY 6 HOURS PRN
Status: DISCONTINUED | OUTPATIENT
Start: 2023-09-03 | End: 2023-09-06 | Stop reason: HOSPADM

## 2023-09-03 RX ORDER — FUROSEMIDE 10 MG/ML
20 INJECTION INTRAMUSCULAR; INTRAVENOUS ONCE
Status: COMPLETED | OUTPATIENT
Start: 2023-09-03 | End: 2023-09-03

## 2023-09-03 RX ORDER — POLYETHYLENE GLYCOL 3350 17 G/17G
17 POWDER, FOR SOLUTION ORAL 2 TIMES DAILY
Status: DISCONTINUED | OUTPATIENT
Start: 2023-09-03 | End: 2023-09-06 | Stop reason: HOSPADM

## 2023-09-03 RX ORDER — DEXMEDETOMIDINE HYDROCHLORIDE 4 UG/ML
.1-1.2 INJECTION, SOLUTION INTRAVENOUS CONTINUOUS
Status: DISCONTINUED | OUTPATIENT
Start: 2023-09-03 | End: 2023-09-04

## 2023-09-03 RX ORDER — OXYCODONE HYDROCHLORIDE 5 MG/1
5 TABLET ORAL EVERY 4 HOURS PRN
Status: DISCONTINUED | OUTPATIENT
Start: 2023-09-03 | End: 2023-09-06 | Stop reason: HOSPADM

## 2023-09-03 RX ORDER — DEXAMETHASONE SODIUM PHOSPHATE 4 MG/ML
10 INJECTION, SOLUTION INTRA-ARTICULAR; INTRALESIONAL; INTRAMUSCULAR; INTRAVENOUS; SOFT TISSUE EVERY 8 HOURS
Status: COMPLETED | OUTPATIENT
Start: 2023-09-03 | End: 2023-09-04

## 2023-09-03 RX ADMIN — ACETAMINOPHEN 975 MG: 325 TABLET, FILM COATED ORAL at 07:36

## 2023-09-03 RX ADMIN — ACETAMINOPHEN 975 MG: 325 TABLET, FILM COATED ORAL at 15:27

## 2023-09-03 RX ADMIN — Medication 40 MG: at 07:40

## 2023-09-03 RX ADMIN — HYDROMORPHONE HYDROCHLORIDE 0.5 MG: 1 INJECTION, SOLUTION INTRAMUSCULAR; INTRAVENOUS; SUBCUTANEOUS at 03:09

## 2023-09-03 RX ADMIN — INSULIN ASPART 1 UNITS: 100 INJECTION, SOLUTION INTRAVENOUS; SUBCUTANEOUS at 03:22

## 2023-09-03 RX ADMIN — FUROSEMIDE 20 MG: 10 INJECTION, SOLUTION INTRAMUSCULAR; INTRAVENOUS at 19:48

## 2023-09-03 RX ADMIN — DEXAMETHASONE SODIUM PHOSPHATE 10 MG: 4 INJECTION, SOLUTION INTRA-ARTICULAR; INTRALESIONAL; INTRAMUSCULAR; INTRAVENOUS; SOFT TISSUE at 17:30

## 2023-09-03 RX ADMIN — FUROSEMIDE 20 MG: 10 INJECTION, SOLUTION INTRAMUSCULAR; INTRAVENOUS at 11:58

## 2023-09-03 RX ADMIN — AMPICILLIN SODIUM AND SULBACTAM SODIUM 3 G: 2; 1 INJECTION, POWDER, FOR SOLUTION INTRAMUSCULAR; INTRAVENOUS at 09:05

## 2023-09-03 RX ADMIN — DEXMEDETOMIDINE HYDROCHLORIDE 0.1 MCG/KG/HR: 400 INJECTION INTRAVENOUS at 10:42

## 2023-09-03 RX ADMIN — AMPICILLIN SODIUM AND SULBACTAM SODIUM 3 G: 2; 1 INJECTION, POWDER, FOR SOLUTION INTRAMUSCULAR; INTRAVENOUS at 15:26

## 2023-09-03 RX ADMIN — ONDANSETRON 4 MG: 2 INJECTION INTRAMUSCULAR; INTRAVENOUS at 09:05

## 2023-09-03 RX ADMIN — HYDROMORPHONE HYDROCHLORIDE 0.5 MG: 1 INJECTION, SOLUTION INTRAMUSCULAR; INTRAVENOUS; SUBCUTANEOUS at 01:49

## 2023-09-03 RX ADMIN — OXYCODONE HYDROCHLORIDE 5 MG: 5 TABLET ORAL at 15:42

## 2023-09-03 RX ADMIN — SENNOSIDES 8.6 MG: 8.6 TABLET, COATED ORAL at 19:48

## 2023-09-03 RX ADMIN — ENOXAPARIN SODIUM 40 MG: 40 INJECTION SUBCUTANEOUS at 07:40

## 2023-09-03 RX ADMIN — AMPICILLIN SODIUM AND SULBACTAM SODIUM 3 G: 2; 1 INJECTION, POWDER, FOR SOLUTION INTRAMUSCULAR; INTRAVENOUS at 03:15

## 2023-09-03 RX ADMIN — POLYETHYLENE GLYCOL 3350 17 G: 17 POWDER, FOR SOLUTION ORAL at 19:48

## 2023-09-03 RX ADMIN — INSULIN ASPART 2 UNITS: 100 INJECTION, SOLUTION INTRAVENOUS; SUBCUTANEOUS at 07:52

## 2023-09-03 RX ADMIN — DEXAMETHASONE SODIUM PHOSPHATE 10 MG: 4 INJECTION, SOLUTION INTRA-ARTICULAR; INTRALESIONAL; INTRAMUSCULAR; INTRAVENOUS; SOFT TISSUE at 10:48

## 2023-09-03 RX ADMIN — OXYCODONE HYDROCHLORIDE 5 MG: 5 TABLET ORAL at 19:55

## 2023-09-03 RX ADMIN — AMPICILLIN SODIUM AND SULBACTAM SODIUM 3 G: 2; 1 INJECTION, POWDER, FOR SOLUTION INTRAMUSCULAR; INTRAVENOUS at 21:15

## 2023-09-03 RX ADMIN — CHLORHEXIDINE GLUCONATE 15 ML: 1.2 SOLUTION ORAL at 07:36

## 2023-09-03 ASSESSMENT — ACTIVITIES OF DAILY LIVING (ADL)
ADLS_ACUITY_SCORE: 34

## 2023-09-03 NOTE — SIGNIFICANT EVENT
SICU team requested assessment for cuff leak by anesthesia team. Patient examined at bedside, not cuff leak detected at 20, but cuff a leak was heard at 30. SICU team has been notified.

## 2023-09-03 NOTE — PROGRESS NOTES
SURGICAL ICU PROGRESS NOTE  09/03/2023        Date of Service (when I saw the patient): 09/03/2023    ASSESSMENT:  Dari Hernandez is a 63-year-old woman with recent onset of dental infection that progressed to anterior Jarocho's angina, requiring operative intubation for airway protection earlier today by ENT at [facility]. After intubation she was then transferred  to [facility] SICU on 8/31/23 for OMFS eval. Pt underwent I&D and extraction of teeth 22 and 23 on 9/1. Remains intubated in SICU for ongoing monitoring.     CHANGES and MAJOR THINGS TODAY:   - PST, wean to extubate pending cuff leak test.   - Bedside swallow per nursing, would pursue soft diet when passes.     PLAN:    Neurological:  # Acute pain   # Agitation   - Monitor neurological status. Delirium preventions and precautions.  - Pain: Hydromorphone PRN, APAP Q8H  - Sedation: Propofol gtt, on low dose overnight, now off.  - RASS goal 0 to -1    HEENT:  # C/f Jarocho's Angina in the setting of recent dental infection  # S/p I&D and removal of teeth # 22 and #23 9/1/23  - OMFS following, appreciate management.  - Complete decadron x24 hours  - Ongoing Amp-Sulbactam for IV ABX  - Penrose drain x7 in place, OMFS plans to remove some drains today.    Pulmonary:  # Post operative ventilatory support    # C/f airway edema  Vent Mode: CMV/AC  (Continuous Mandatory Ventilation/ Assist Control)  FiO2 (%): 30 %  Resp Rate (Set): 12 breaths/min  Tidal Volume (Set, mL): 400 mL  PEEP (cm H2O): 5 cmH2O  Pressure Support (cm H2O): 7 cmH2O  Resp: 16  - Emergently intubated at [facility] with c/f airway given ongoing infection, now s/p I&D  - Ventilatory bundle, HOB elevation for VAP prevention.   - Pressure support and ventilator weaning when meets criteria.   - Completed 24 hours of steroid treatment post I&D. Will need to demonstrate cuff leak prior to extubation.   - CT neck post I&D obtained 9/2, will discuss with OMFS team.   - Titrate oxygen to keep saturation above 92  %.    Cardiovascular:    # No acute concerns  - Monitor hemodynamic status.   - Pressors: not currently requiring    GI/Nutrition:    # c/f nutrition status  - No indication for parenteral nutrition.  - Nutrition consult. Appreciate recs  - OG in place, okay for meds  - Bowel reg ordered  - Trophic feeds started yesterday afternoon at 20 ml/hr. Will stop this AM for potential extubation.       Renal/Fluids/Electrolytes:  # Volume status  - Electrolyte replacement protocol in place.   - Urine output adequate . Will continue to monitor intake and output.  - Will removed 9/2 per nursing driven protocol, voiding per bedpan or external catheter device.   - I&O: +~770/24 hours. On minimal O2 support (30% peep 5), slight edema noted to extremities. Goal net negative 500 today, hold on diuresis this AM and monitor.     Endocrine:  # Stress hyperglycemia   - Sliding scale for glucose management. Goal to keep BG < 180 for optimal wound healing   - -189 and received 7 units over last 24 hours    Infectious disease:   # c/f Jarocho's angina  - I&D 9/1  - Cultures pending, initial results gram + cocci  - Trend CBC and fever curve    - Antibiotics:  - Unasyn     Hematology:    # No acute concerns  - Hemoglobin 12.7 this AM from 12.6 yesterday, stable. Monitor and trend. Threshold for transfusion if hgb <7.0 or signs/symptoms of hypoperfusion.       Musculoskeletal:  # Weakness and deconditioning of critical illness    - Physical and occupational therapy consults.    Skin:  # Surgical drains  - 7 Penrose drains in place around neck, fluffs in place.  - Dilgent cares to prevent skin breakdown and wound formation.      General Cares/Prophylaxis:    DVT Prophylaxis: Pneumatic Compression Devices. Enoxaparin (okayed with OMFS team).  GI Prophylaxis: PPI  Restraints: Restraints for medical healing needed: YES    Lines/ tubes/ drains:  - PIV x2  - OG -- remove with extubation  - Penrose x7  - ETT -- remove with  extubation    Disposition:  - Surgical ICU, possible transfer to floor this afternoon pending stability.      Patient seen, findings and plan discussed with surgical ICU staff, Dr. Cardenas.    Time spent on this Encounter   Billing:  I spent 45 minutes bedside and on the inpatient unit today managing the critical care of Dari Hernandez in relation to the issues listed in this note.      MART Pelaez CNP    ====================================  INTERVAL HISTORY:   Overall course reviewed. Patient intubated and awake. Patient evaluated at bedside. Doing well on PST, plan to extubate.     OBJECTIVE:   1. VITAL SIGNS:   Temp:  [97.6  F (36.4  C)-100  F (37.8  C)] 98.8  F (37.1  C)  Pulse:  [] 58  Resp:  [8-27] 16  BP: ()/() 90/59  FiO2 (%):  [30 %] 30 %  SpO2:  [95 %-100 %] 98 %  Vent Mode: CMV/AC  (Continuous Mandatory Ventilation/ Assist Control)  FiO2 (%): 30 %  Resp Rate (Set): 12 breaths/min  Tidal Volume (Set, mL): 400 mL  PEEP (cm H2O): 5 cmH2O  Pressure Support (cm H2O): 7 cmH2O  Resp: 16      2. INTAKE/ OUTPUT:   I/O last 3 completed shifts:  In: 1331.01 [I.V.:441.01; NG/GT:530]  Out: 825 [Urine:825]    3. PHYSICAL EXAMINATION:  General: Laying in bed, awake while orally intubated.  HEENT: penrose drain x7, wrapped in fluffs with serosang/purulent drainage noted.  Neuro: Awake and alert. RASS 0, NAD  Pulm/Resp: Clear breath sounds bilaterally without rhonchi, crackles or wheeze, breathing non-labored on mechanical ventilation  CV: RRR  Abdomen: Soft, non-distended, non-tender, no rebound tenderness or guarding, no masses  : No whitfield present.  Incisions/Skin: skin intact  MSK/Extremities: trace peripheral edema, moving all extremities, peripheral pulses intact, calves, extremities well perfused    4. INVESTIGATIONS:   Arterial Blood Gases   Recent Labs   Lab 09/01/23  0114 08/31/23 2005   PH 7.44 7.46*   PCO2 37 35   PO2 95 125*   HCO3 25 25       Complete Blood Count   Recent Labs    Lab 09/03/23  0538 09/02/23  0619 09/01/23  0026 08/31/23  1608   WBC 14.4* 14.4* 19.3* 17.4*   HGB 12.7 12.6 14.3 16.6*    132* 152 176       Basic Metabolic Panel  Recent Labs   Lab 09/03/23  0538 09/03/23  0322 09/02/23  2349 09/02/23  1946 09/02/23  1134 09/02/23  1014 09/02/23  0751 09/02/23  0619 09/01/23  0435 09/01/23  0026 08/31/23  1625 08/31/23  1608     --   --   --   --   --   --  141  --  144  --  142   POTASSIUM 3.9  --   --   --   --   --   --  4.1  --  3.8  --  4.0   CHLORIDE 112*  --   --   --   --   --   --  110*  --  109*  --  103   CO2 21*  --   --   --   --   --   --  22  --  23  --  26   BUN 22.7  --   --   --   --   --   --  17.9  --  10.1  --  10.5   CR 0.59  --   --   --   --  0.64  --  0.62  --  0.61   < > 0.70   * 189* 181* 170*   < >  --    < > 165*   < > 163*   < > 123*    < > = values in this interval not displayed.       Liver Function Tests  Recent Labs   Lab 09/01/23  0026 08/31/23  1608   AST 20 28   ALT 34 49   ALKPHOS 82 99   BILITOTAL 0.8 1.7*   ALBUMIN 3.2* 4.0   INR 1.19*  --        Pancreatic Enzymes  No lab results found in last 7 days.  Coagulation Profile  Recent Labs   Lab 09/01/23 0026   INR 1.19*   PTT 30           5. RADIOLOGY:   Recent Results (from the past 24 hour(s))   CT Head w/o Contrast    Narrative    CT HEAD W/O CONTRAST 9/2/2023 11:29 AM    History: Concern for Ludwigs, ordered per OMFS     Comparison:  Same-day CT neck.      Technique: Using multidetector thin collimation helical acquisition  technique, axial, coronal and sagittal CT images from the skull base  to the vertex were obtained without intravenous contrast.   (topogram) image(s) also obtained and reviewed.    Findings: There is no intracranial hemorrhage, mass effect, or midline  shift. Gray/white matter differentiation in both cerebral hemispheres  is preserved. Ventricles are proportionate to the cerebral sulci. The  basal cisterns are clear.  Atherosclerotic  calcifications of bilateral carotid siphons.  Multiple tiny round lucencies throughout the calvarium,  indeterminate.. The visualized portions of the paranasal sinuses and  mastoid air cells are clear.      Impression    Impression:  No acute intracranial pathology.     I have personally reviewed the examination and initial interpretation  and I agree with the findings.    CLAUDIO MORATAYA MD         SYSTEM ID:  [id number]   CT Soft Tissue Neck w Contrast    Narrative    CT SOFT TISSUE NECK W CONTRAST 9/2/2023 11:30 AM    History:  Concern for infection      Comparison:  No prior available imaging     Technique: Following intravenous administration of nonionic iodinated  contrast medium, thin section helical CT images were obtained from the  skull base down to the level of the aortic arch.  Axial, coronal and  sagittal reformations were performed with 2-3 mm slice thickness  reconstruction. Images were reviewed in soft tissue, lung and bone  windows.    Contrast: 10ml isovue 370    Findings:     Status post incision and drainage of bilateral sublingual,  submandibular and submental spaces and extraction of teeth #22 and 23.  Scattered air densities within soft tissues. Diffuse fat stranding  predominantly involving buccal space, parotid space, submental and  submandibular planes; left greater than right. This also extends  posterior cervical triangle and visceral space on the left side . No  intrathoracic or intracranial extension. No residual collection  identified. Bilateral  space, carotid space as well as right  parapharyngeal space relatively preserved. No prevertebral soft tissue  thickening. Pharyngeal mucosal space is intact.    Partially included enteric tube. Intubation tube terminating 1 cm  above the brissa.    Thyroid gland appears normal.Maxillary edentulism.    There is no evident cervical lymphadenopathy. The major vascular  structures in the neck appear unremarkable. No internal jugular  vein  thrombosis.    Evaluation of the osseous structures demonstrate no worrisome lytic or  sclerotic lesion. No overt spinal canal or neuroforaminal stenosis.  The visualized paranasal sinuses are clear. The mastoid air cells are  clear.     The visualized lung apices are clear. Bilateral apical scarring.      Impression    Impression:  Status post incision and drainage of bilateral sublingual,  submandibular and submental spaces and extraction of teeth #22 and 23.  Diffuse fat stranding predominantly involving buccal space, parotid  space, submental and submandibular planes; left greater than right. No  residual collection identified.    I have personally reviewed the examination and initial interpretation  and I agree with the findings.    CLAUDIO MORATAYA MD         SYSTEM ID:  [id number]       =========================================

## 2023-09-03 NOTE — PROGRESS NOTES
.ORAL & MAXILLOFACIAL SURGERY   PROGRESS NOTE  Dari Hernandez  MRN: [id number],  : 1960           ASSESSMENT:  63 year old female s/p POD2 of an Incision and drainage of bilateral sublingual, submandibular, submental spaces and extraction of teeth #s 22 and 23 on 23. 7 penrose drains were placed. Moderate mixed-serous output from left drains and minimal drainage from right drains. Patient is presenting with persistent post-operative edema .      PLAN:  No additional surgical interventions planned from OMFS perspective at this time.   Nurse to change fluffs on neck dressing once saturated. Expect moderate serosanguinous / purulent drainage from neck incision for a few days  Spoke with family as to why gauze needed to be kept in place.   Continue IV Unasyn  HOB >30   Mild oozing from dental extraction sites is expected, ok to place moist gauze with pressure to aid in hemostasis.   Culture pending   Heat to face, NO ICE  Ok for routine oral care  Ok for oral suction  All 7 penrose drains will likely be in place for at least another day,  pending clinical improvement  OK to extubate from an OMFS perspective.   OMFS to take over primary once patient no longer requires ICU level of care. Please page OMFS at that time.     Discussed with chief resident and staff.    Iesha Hebert DMD  Oral & Maxillofacial Surgery, Intern    Please contact the OMFS resident on-call with questions or concerns.  ____________________________________      SUBJECTIVE:  Patient continues to be intubated and sedated.       PHYSICAL EXAM:   Vitals:    23 0300 23 0400 23 0425 23 0500   BP: 95/56 103/59  90/59   BP Location:  Left arm     Pulse: 60 56  58   Resp: 18 16  16   Temp:  98.8  F (37.1  C)     TempSrc:  Axillary     SpO2: 96% 97% 99% 98%   Weight:       Height:            General: Laying in bed, in no acute distress, intubated  HEENT: ETT, OGT in place,   Left sided erythema which was firm and warm, Left neck  incision with all 7 penrose drains were in place, with mild to moderate seropurulent drainage. Inferior border of the mandible is palpable on both the right and left. Little to no drainage on the right side. But moderate drainage on the left. Patient is tolerating secretions.   Intraoral: Left mandibular extraction sockets of #22 & 23 are hemostatic with sutures still in place. Healing as expected. Tongue and floor of the mouth still edematous, but softer compared to prior to surgery. Tongue with mild to moderate purulent sanguinous discharge. Generalized poor dentition.   Neuro: No acute distress, awakens to verbal stimulations, opens/squeezes eyes closed, follows commands to move all extremities  Pulm/Resp: Mechanically vented   CV: RRR  Incisions/Skin: Surgical site with 7 penrose drains, active drainage, covered with gauze and wrap with slight sanguinous staining, site clean, dry and intact.  MSK/Extremities: moving all extremities, peripheral pulses intact, calves soft, extremities warm and well perfuse    LABS:   CBC RESULTS:   Recent Labs   Lab Test 09/03/23  0538   WBC 14.4*   RBC 4.00   HGB 12.7   HCT 38.3   MCV 96   MCH 31.8   MCHC 33.2   RDW 15.0         Last Comprehensive Metabolic Panel:  Sodium   Date Value Ref Range Status   09/03/2023 143 136 - 145 mmol/L Final     Potassium   Date Value Ref Range Status   09/03/2023 3.9 3.4 - 5.3 mmol/L Final     Chloride   Date Value Ref Range Status   09/03/2023 112 (H) 98 - 107 mmol/L Final     Carbon Dioxide (CO2)   Date Value Ref Range Status   09/03/2023 21 (L) 22 - 29 mmol/L Final     Anion Gap   Date Value Ref Range Status   09/03/2023 10 7 - 15 mmol/L Final     Glucose   Date Value Ref Range Status   09/03/2023 186 (H) 70 - 99 mg/dL Final     GLUCOSE BY METER POCT   Date Value Ref Range Status   09/03/2023 190 (H) 70 - 99 mg/dL Final     Urea Nitrogen   Date Value Ref Range Status   09/03/2023 22.7 8.0 - 23.0 mg/dL Final     Creatinine   Date Value  Ref Range Status   09/03/2023 0.59 0.51 - 0.95 mg/dL Final     GFR Estimate   Date Value Ref Range Status   09/03/2023 >90 >60 mL/min/1.73m2 Final     GFR, ESTIMATED POCT   Date Value Ref Range Status   08/31/2023 >60 >60 mL/min/1.73m2 Final     Calcium   Date Value Ref Range Status   09/03/2023 8.2 (L) 8.8 - 10.2 mg/dL Final        RADIOLOGY:  Narrative & Impression   XR CHEST PORT 1 VIEW  9/1/2023 1:59 AM      HISTORY:  ETT retracted        COMPARISON:  8/31/2023     FINDINGS:   Frontal view of the chest. Patient is rotated. Interval retraction of  the endotracheal tube with tip projecting 2.7 cm above the brissa.  Partially visualized enteric tube.      Stable enlarged cardiac silhouette. Decreased left  basilar/retrocardiac opacities. Continued blunting of the left  costophrenic angle. No appreciable pneumothorax.                                                                      IMPRESSION:   1. Interval retraction of the endotracheal tube with tip projecting  2.7 cm above the brissa.  2. Improved left effusion and left lower lobe atelectasis.     I have personally reviewed the examination and initial interpretation  and I agree with the findings.     DANTE WALKER MD

## 2023-09-03 NOTE — PLAN OF CARE
Major Shift Events: Pt pressure supported this morning 30% 5/5 from 6679-4690. Minute tidal volumtes low, put back on CMV settings. Anesthesia by to assess cuff leak. Pt deemed inappropriate today for extubation. TF resumed, predecex initiated. Pt jose'd down to the 30s. Precedex stopped.     Neuro: Completely intact. Per , c/o discomfort with neck dressings. Primary team notified. Oxy given x1 for pain. Precedex stopped d/t bradycardia. Pt seems to be tolerating ETT.    CV: SR/SB, mostly 50s throughout shift with dips into the 30s with Precedex on. Precedex stopped. Normotensive. Afebrile.Diruesed with 20mg Lasiks.     Resp: PS in morning for about 4 hours 5/5 30% FiO2. Resumed with CMV settings 30%/400/12/5. LS coarse with thick/tan secretions. Copious oral secretions. No cuff leak with RT or with anesthesia.     Skin: 7 penrose drains in place with minimal serosang drainage. Gauze dressing in place, tight d/t swelling around neck. Team aware. Came to bedside at 1830 to assess.     Access: PIV x2 with 10mL NS TKO running.    Plan: Pending appropriate cuff leak, plan for pt to be extubated tomorrow.     Jess Drake RN on 9/3/2023 at 6:07 PM

## 2023-09-03 NOTE — PLAN OF CARE
ICU End of Shift Summary. See flowsheets for vital signs and detailed assessment.    Changes this shift: Per  pt AO x4. ROTH. Makes needs known appropriately. Complaints of L sided jaw pain, Dilaudid 0.5mg given with relief. Propofol restarted overnight; pt attempting to exit bed and was not redirectable despite attempts from both bedside RN and  support. Overnight propofol weaned and stopped at 0545. CMV settings 40%/400/12/5. Thick, tan secretions via ETT. Pt self suctioning oral secretions. LS coarse. Sinus to sinus jose. Soft pressures but MAPs remains greater than 65mmHg. Tmax 100.0, scheduled Tylenol given. Tolerating TF at goal of 20mL/hour with SFWF. Voided via purewick x1, 300mL. No BM per this shift. Penrose drains patent;  minimal serosanguineous output; neck and jaw remain edematous.     Plan: Plan for potential extubation today (9/3). Will contact primary care team with questions and concerns.     Kelly RODRÍGUEZ RN  Critical Care Flex Team     Goal Outcome Evaluation:    Plan of Care Reviewed With: patient  Overall Patient Progress: no changeOverall Patient Progress: no change  Outcome Evaluation: AO (per ) VSS. Penrose drains remain patent. Propofol restarted.    Problem: Plan of Care - These are the overarching goals to be used throughout the patient stay.    Goal: Plan of Care Review  Description: The Plan of Care Review/Shift note should be completed every shift.  The Outcome Evaluation is a brief statement about your assessment that the patient is improving, declining, or no change.  This information will be displayed automatically on your shift note.  Outcome: Progressing  Flowsheets (Taken 9/3/2023 0548)  Outcome Evaluation: AO (per ) VSS. Penrose drains remain patent. Propofol restarted.  Plan of Care Reviewed With: patient  Overall Patient Progress: no change

## 2023-09-04 LAB
ANION GAP SERPL CALCULATED.3IONS-SCNC: 13 MMOL/L (ref 7–15)
BACTERIA ABSC ANAEROBE+AEROBE CULT: ABNORMAL
BACTERIA ABSC ANAEROBE+AEROBE CULT: ABNORMAL
BUN SERPL-MCNC: 29.7 MG/DL (ref 8–23)
CALCIUM SERPL-MCNC: 8.3 MG/DL (ref 8.8–10.2)
CHLORIDE SERPL-SCNC: 111 MMOL/L (ref 98–107)
CREAT SERPL-MCNC: 0.68 MG/DL (ref 0.51–0.95)
DEPRECATED HCO3 PLAS-SCNC: 23 MMOL/L (ref 22–29)
ERYTHROCYTE [DISTWIDTH] IN BLOOD BY AUTOMATED COUNT: 15.1 % (ref 10–15)
GFR SERPL CREATININE-BSD FRML MDRD: >90 ML/MIN/1.73M2
GLUCOSE BLDC GLUCOMTR-MCNC: 120 MG/DL (ref 70–99)
GLUCOSE BLDC GLUCOMTR-MCNC: 137 MG/DL (ref 70–99)
GLUCOSE BLDC GLUCOMTR-MCNC: 151 MG/DL (ref 70–99)
GLUCOSE BLDC GLUCOMTR-MCNC: 172 MG/DL (ref 70–99)
GLUCOSE BLDC GLUCOMTR-MCNC: 172 MG/DL (ref 70–99)
GLUCOSE BLDC GLUCOMTR-MCNC: 192 MG/DL (ref 70–99)
GLUCOSE BLDC GLUCOMTR-MCNC: 200 MG/DL (ref 70–99)
GLUCOSE SERPL-MCNC: 189 MG/DL (ref 70–99)
HCT VFR BLD AUTO: 40 % (ref 35–47)
HGB BLD-MCNC: 13.4 G/DL (ref 11.7–15.7)
MAGNESIUM SERPL-MCNC: 2.3 MG/DL (ref 1.7–2.3)
MCH RBC QN AUTO: 31.8 PG (ref 26.5–33)
MCHC RBC AUTO-ENTMCNC: 33.5 G/DL (ref 31.5–36.5)
MCV RBC AUTO: 95 FL (ref 78–100)
PHOSPHATE SERPL-MCNC: 3.4 MG/DL (ref 2.5–4.5)
PLATELET # BLD AUTO: 168 10E3/UL (ref 150–450)
POTASSIUM SERPL-SCNC: 3.8 MMOL/L (ref 3.4–5.3)
RBC # BLD AUTO: 4.21 10E6/UL (ref 3.8–5.2)
SODIUM SERPL-SCNC: 147 MMOL/L (ref 136–145)
WBC # BLD AUTO: 11.2 10E3/UL (ref 4–11)

## 2023-09-04 PROCEDURE — 80048 BASIC METABOLIC PNL TOTAL CA: CPT

## 2023-09-04 PROCEDURE — 250N000011 HC RX IP 250 OP 636: Mod: JZ

## 2023-09-04 PROCEDURE — A7035 POS AIRWAY PRESS HEADGEAR: HCPCS

## 2023-09-04 PROCEDURE — 250N000011 HC RX IP 250 OP 636: Mod: JZ | Performed by: PHYSICIAN ASSISTANT

## 2023-09-04 PROCEDURE — 94003 VENT MGMT INPAT SUBQ DAY: CPT

## 2023-09-04 PROCEDURE — 999N000128 HC STATISTIC PERIPHERAL IV START W/O US GUIDANCE

## 2023-09-04 PROCEDURE — 120N000002 HC R&B MED SURG/OB UMMC

## 2023-09-04 PROCEDURE — 85027 COMPLETE CBC AUTOMATED: CPT

## 2023-09-04 PROCEDURE — 250N000013 HC RX MED GY IP 250 OP 250 PS 637

## 2023-09-04 PROCEDURE — 250N000011 HC RX IP 250 OP 636: Performed by: INTERNAL MEDICINE

## 2023-09-04 PROCEDURE — 250N000013 HC RX MED GY IP 250 OP 250 PS 637: Performed by: DENTIST

## 2023-09-04 PROCEDURE — 999N000253 HC STATISTIC WEANING TRIALS

## 2023-09-04 PROCEDURE — 99232 SBSQ HOSP IP/OBS MODERATE 35: CPT

## 2023-09-04 PROCEDURE — 83735 ASSAY OF MAGNESIUM: CPT

## 2023-09-04 PROCEDURE — 84100 ASSAY OF PHOSPHORUS: CPT

## 2023-09-04 PROCEDURE — 999N000157 HC STATISTIC RCP TIME EA 10 MIN

## 2023-09-04 PROCEDURE — 36415 COLL VENOUS BLD VENIPUNCTURE: CPT

## 2023-09-04 RX ORDER — FUROSEMIDE 10 MG/ML
20 INJECTION INTRAMUSCULAR; INTRAVENOUS ONCE
Status: COMPLETED | OUTPATIENT
Start: 2023-09-04 | End: 2023-09-04

## 2023-09-04 RX ORDER — POTASSIUM CHLORIDE 7.45 MG/ML
10 INJECTION INTRAVENOUS
Status: COMPLETED | OUTPATIENT
Start: 2023-09-04 | End: 2023-09-04

## 2023-09-04 RX ORDER — AMOXICILLIN 250 MG
2 CAPSULE ORAL 2 TIMES DAILY
Status: DISCONTINUED | OUTPATIENT
Start: 2023-09-04 | End: 2023-09-06 | Stop reason: HOSPADM

## 2023-09-04 RX ADMIN — ENOXAPARIN SODIUM 40 MG: 40 INJECTION SUBCUTANEOUS at 07:53

## 2023-09-04 RX ADMIN — POTASSIUM CHLORIDE 10 MEQ: 7.46 INJECTION, SOLUTION INTRAVENOUS at 12:31

## 2023-09-04 RX ADMIN — ACETAMINOPHEN 975 MG: 325 TABLET, FILM COATED ORAL at 07:52

## 2023-09-04 RX ADMIN — AMPICILLIN SODIUM AND SULBACTAM SODIUM 3 G: 2; 1 INJECTION, POWDER, FOR SOLUTION INTRAMUSCULAR; INTRAVENOUS at 16:23

## 2023-09-04 RX ADMIN — AMPICILLIN SODIUM AND SULBACTAM SODIUM 3 G: 2; 1 INJECTION, POWDER, FOR SOLUTION INTRAMUSCULAR; INTRAVENOUS at 21:50

## 2023-09-04 RX ADMIN — AMPICILLIN SODIUM AND SULBACTAM SODIUM 3 G: 2; 1 INJECTION, POWDER, FOR SOLUTION INTRAMUSCULAR; INTRAVENOUS at 09:59

## 2023-09-04 RX ADMIN — DEXAMETHASONE SODIUM PHOSPHATE 10 MG: 4 INJECTION, SOLUTION INTRA-ARTICULAR; INTRALESIONAL; INTRAMUSCULAR; INTRAVENOUS; SOFT TISSUE at 01:44

## 2023-09-04 RX ADMIN — POLYETHYLENE GLYCOL 3350 17 G: 17 POWDER, FOR SOLUTION ORAL at 07:52

## 2023-09-04 RX ADMIN — POTASSIUM CHLORIDE 10 MEQ: 7.46 INJECTION, SOLUTION INTRAVENOUS at 14:31

## 2023-09-04 RX ADMIN — Medication 15 ML: at 07:52

## 2023-09-04 RX ADMIN — FUROSEMIDE 20 MG: 10 INJECTION, SOLUTION INTRAMUSCULAR; INTRAVENOUS at 09:58

## 2023-09-04 RX ADMIN — AMPICILLIN SODIUM AND SULBACTAM SODIUM 3 G: 2; 1 INJECTION, POWDER, FOR SOLUTION INTRAMUSCULAR; INTRAVENOUS at 04:01

## 2023-09-04 RX ADMIN — OXYCODONE HYDROCHLORIDE 5 MG: 5 TABLET ORAL at 00:16

## 2023-09-04 RX ADMIN — SENNOSIDES 8.6 MG: 8.6 TABLET, COATED ORAL at 07:52

## 2023-09-04 RX ADMIN — ACETAMINOPHEN 975 MG: 325 TABLET, FILM COATED ORAL at 00:16

## 2023-09-04 ASSESSMENT — ACTIVITIES OF DAILY LIVING (ADL)
ADLS_ACUITY_SCORE: 34
ADLS_ACUITY_SCORE: 30
ADLS_ACUITY_SCORE: 30
ADLS_ACUITY_SCORE: 34
ADLS_ACUITY_SCORE: 30
ADLS_ACUITY_SCORE: 34
ADLS_ACUITY_SCORE: 30
ADLS_ACUITY_SCORE: 34
ADLS_ACUITY_SCORE: 34
ADLS_ACUITY_SCORE: 30

## 2023-09-04 NOTE — PROCEDURES
Anesthesiology Extubation Note    Called to bedside by SICU team to evaluate patient for extubation given history of no cuff leak. Evaluated patient with attending anesthesiologist. Patient has been on dexmethasone for 48 hours and has been diuresed. Audible cuff leak present, ventilator confirmed. Extubated over Cook airway exchange catheter for safety. Pt evaluated at bedside for several minutes. Patient breathing comfortably, saturating high 90s on NC. No stridor heard.     Please page anesthesiology with any concerns.     Kamar Moyer,   PGY-4, Anesthesiology  [phone]

## 2023-09-04 NOTE — PLAN OF CARE
ICU End of Shift Summary. See flowsheets for vital signs and detailed assessment.    Changes this shift: AO x4 per family and . ROTH, follows commands and makes needs known appropriately. PRN Oxy 5mg given x2 for jaw pain with relief. CMV settings 30%/400/12/5, Switch to pressure support at 0630 in anticipation for extubation. Self suctioning oral secretions, thick tan secretions via ETT. Bradycardic. Normotensive. Tmax 99.6. Neck and jaw remain edematous. 7 penrose patent/WNL, consistent serosanguineous output. TF at goal of 20mL/hour with SFWF; held at 0600 in anticipation for extubation. Diuresed with 20mg of Lasix with adequate output via Purewick. No BM per this shift.    0645 labs remain pending.  Plan: Potentially extubate today (9/4). Will contact primary care team with questions and concerns.     Kelly Pereyra RN   Critical Care Flex Team     Goal Outcome Evaluation:      Plan of Care Reviewed With: patient    Overall Patient Progress: no changeOverall Patient Progress: no change    Outcome Evaluation: AOVSS. ROTH. Sleeping between cares.    Problem: Plan of Care - These are the overarching goals to be used throughout the patient stay.    Goal: Plan of Care Review  Description: The Plan of Care Review/Shift note should be completed every shift.  The Outcome Evaluation is a brief statement about your assessment that the patient is improving, declining, or no change.  This information will be displayed automatically on your shift note.  Outcome: Progressing  Flowsheets (Taken 9/4/2023 5125)  Outcome Evaluation: AOVSS. ROTH. Sleeping between cares.  Plan of Care Reviewed With: patient  Overall Patient Progress: no change

## 2023-09-04 NOTE — PROGRESS NOTES
Pt still has no cuff leak this morning. She has been on PST 5/5 since 6:30 am and appears comfortable.       Shakira Aguila, RT

## 2023-09-04 NOTE — PLAN OF CARE
Transferred to: Unit 6A  at 1730   Belongings: Sent with patient (see flowsheet)  Will removed? Yes  Central line removed? NA  Chart and medications sent with patient Yes  Family notified: Yes

## 2023-09-04 NOTE — PROGRESS NOTES
SURGICAL ICU PROGRESS NOTE  09/04/2023        Date of Service (when I saw the patient): 09/04/2023    ASSESSMENT:   Dari Hernandez is a 63-year-old woman with recent onset of dental infection that progressed to anterior Jarocho's angina, requiring operative intubation for airway protection by ENT at [facility]. After intubation she was then transferred to [facility] SICU on 8/31/23 for OMFS eval. Pt underwent I&D and extraction of teeth 22 and 23 on 9/1. Remains intubated in SICU for ongoing monitoring. Extubated 9/4.    CHANGES and MAJOR THINGS TODAY:   - Extubated with anesthesia at bedside 9/4 am, currently saturating well on 4L NC  - Passed nursing bedside swallow, advanced diet to soft per OMFS   - Lasix 20 mg IV x 1 for fluid goal of net even to -500 ml    PLAN:    Neurological:  # Acute pain    - Monitor neurological status. Delirium preventions and precautions.  - Pain: Oxycodone 5 mg q 4 hours PRN, Tylenol Q8H scheduled    HEENT:  # C/f Jarocho's Angina in the setting of recent dental infection  # S/p I&D and removal of teeth # 22 and #23 9/1/23  - OMFS following, appreciate management.  - Completed decadron for optimization of edema  - Ongoing Amp-Sulbactam for IV ABX  - Penrose drain x7 in place, removal per OMFS    Pulmonary:  # Acute hypoxic insufficiency    # C/f airway edema, resolved  - Emergently intubated at [facility] with c/f airway given ongoing infection, now s/p I&D   - Completed steroid treatment post I&D for edema.   - CT neck post I&D obtained 9/2, no further imaging needed per OMFS team  - Titrate oxygen to keep saturation above 92 %.  - Extubated by anesthesia at bedside 9/4 am    Cardiovascular:    # No acute concerns  - Monitor hemodynamic status.   - Pressors: not currently requiring    GI/Nutrition:    # c/f nutrition status  - No indication for parenteral nutrition.  - Nutrition consult. Appreciate recs  - Bowel reg ordered-Miralax 2 times daily, Senna docusate 2 times daily  - No BM since  admission, will pursue PRN MOM 9/4 afternoon pending BM  - Trophic feeds previously at 20 ml/hr. Stopped 9/4 am in anticipation of extubation  - Passed nursing bedside swallow evaluation, diet advanced to soft per OMFS    Renal/Fluids/Electrolytes:  # Volume status  # Hypernatremia  - Urine output adequate . Will continue to monitor intake and output.  - I&O: -700 for last 24 hours, +3L since admit. Will monitor volume status. Goal net even to -500 ml for 24 hours.  - Lasix 20 mg IV x1 9/4  - Mild Hypernatremia on am labs 9/4, will continue to monitor during diuresis and encourage PO intake, daily BMP  - electrolyte replacement protocol in place.     Endocrine:  # Stress hyperglycemia   - Sliding scale for glucose management. Goal to keep BG < 180 for optimal wound healing   - -200 and received 11 units over last 24 hours 9/3-9/4, anticipate hyperglycemia to improve off of steroid treatment    Infectious disease:   # c/f Jarocho's angina  - I&D 9/1  - WBC 11.2 9/4 from 14.4 9/3  - Cultures pending, initial results gram + cocci  - Trend CBC and fever curve     - Antibiotics:  - Unasyn     Hematology:    # No acute concerns  - Hemoglobin 13.4. Monitor and trend. Threshold for transfusion if hgb <7.0 or signs/symptoms of hypoperfusion.     Musculoskeletal:  # Weakness and deconditioning of critical illness    - Physical and occupational therapy consults.     Skin:  # Surgical drains  - 7 Penrose drains in place around neck, fluffs in place.  - Dilgent cares to prevent skin breakdown and wound formation.     General Cares/Prophylaxis:    DVT Prophylaxis: Pneumatic Compression Devices. Enoxaparin (okayed with OMFS team).  GI Prophylaxis: not applicable  Restraints: Restraints for medical healing needed: No    Lines/ tubes/ drains:  - PIV x2  - OG -- removed with extubation  - Penrose x7  - ETT -- removed with extubation    Disposition:  - Surgical ICU, possible transfer to floor this afternoon pending continued  observation and stability.       Patient seen, findings and plan discussed with surgical ICU staff, Dr. Mejias.    Time spent on this Encounter   Billing:  I spent 40 minutes bedside and on the inpatient unit today managing the care of Dari Hernandez in relation to the issues listed in this note.      Radha Zhao PA-C    ====================================  INTERVAL HISTORY: No acute concerns overnight. Patient seen with translation service. Denies pain and abdominal pain on exam. Acknowledges sleeping well overnight.    OBJECTIVE:   1. VITAL SIGNS:   Temp:  [98.9  F (37.2  C)-99.6  F (37.6  C)] 99.6  F (37.6  C)  Pulse:  [46-73] 63  Resp:  [11-21] 12  BP: (113-142)/(70-94) 140/78  FiO2 (%):  [30 %] 30 %  SpO2:  [92 %-99 %] 99 %  Vent Mode: CPAP/PS  (Continuous positive airway pressure with Pressure Support)  FiO2 (%): 30 %  Resp Rate (Set): 12 breaths/min  Tidal Volume (Set, mL): 400 mL  PEEP (cm H2O): 5 cmH2O  Pressure Support (cm H2O): 5 cmH2O  Resp: 12      2. INTAKE/ OUTPUT:   I/O last 3 completed shifts:  In: 1188.56 [I.V.:348.56; NG/GT:440]  Out: 1900 [Urine:1900]    3. PHYSICAL EXAMINATION:  General: Sitting awake in bed, no acute distress.  HEENT:Gauze bandage in place around neck with spots of sanguinous staining. ETT in place.  Neuro: Alert, no acute distress. Follows simple commands and nods or shakes head appropriately to questions of pain. Moving all extremities, equal  strength bilaterally. Pupils symmetric.  Pulm/Resp: Coarse breath sounds right lung. Clear breath sounds left lung without rhonchi, crackles or wheeze, Breathing non-labored on mechanical ventilation with pressure support.  CV: RRR  Abdomen: Soft, non-distended, non-tender without guarding  : Will not present, voiding per Purewick  Incisions/Skin: Surgical sites covered with gauze bandages  MSK/Extremities: moving all extremities, peripheral pulses intact, calves soft, extremities warm and well perfused    4.  INVESTIGATIONS:   Arterial Blood Gases   Recent Labs   Lab 09/01/23  0114 08/31/23 2005   PH 7.44 7.46*   PCO2 37 35   PO2 95 125*   HCO3 25 25     Complete Blood Count   Recent Labs   Lab 09/03/23  0538 09/02/23 0619 09/01/23 0026 08/31/23  1608   WBC 14.4* 14.4* 19.3* 17.4*   HGB 12.7 12.6 14.3 16.6*    132* 152 176     Basic Metabolic Panel  Recent Labs   Lab 09/04/23  0405 09/04/23  0022 09/03/23  1947 09/03/23  1528 09/03/23  0751 09/03/23  0538 09/02/23  1134 09/02/23  1014 09/02/23  0751 09/02/23 0619 09/01/23  0435 09/01/23 0026 08/31/23  1625 08/31/23  1608   NA  --   --   --   --   --  143  --   --   --  141  --  144  --  142   POTASSIUM  --   --   --   --   --  3.9  --   --   --  4.1  --  3.8  --  4.0   CHLORIDE  --   --   --   --   --  112*  --   --   --  110*  --  109*  --  103   CO2  --   --   --   --   --  21*  --   --   --  22  --  23  --  26   BUN  --   --   --   --   --  22.7  --   --   --  17.9  --  10.1  --  10.5   CR  --   --   --   --   --  0.59  --  0.64  --  0.62  --  0.61   < > 0.70   * 192* 161* 182*   < > 186*   < >  --    < > 165*   < > 163*   < > 123*    < > = values in this interval not displayed.     Liver Function Tests  Recent Labs   Lab 09/01/23  0026 08/31/23  1608   AST 20 28   ALT 34 49   ALKPHOS 82 99   BILITOTAL 0.8 1.7*   ALBUMIN 3.2* 4.0   INR 1.19*  --      Pancreatic Enzymes  No lab results found in last 7 days.  Coagulation Profile  Recent Labs   Lab 09/01/23  0026   INR 1.19*   PTT 30     =========================================

## 2023-09-04 NOTE — PROGRESS NOTES
[facility], Procedure Note     Pt extubated using a cook catheter, with anesthesia at bedside. PT tolerated extubation well with no complications.                  Extubation:       Dari Hernandez  MRN# [id number]   September 4, 2023, 9:04 AM         Patient extubated at: September 4, 2023, 0855    Supplemental Oxygen: Via nasal cannula at 4 liters per minute   Cough: The cough is good and productive   Secretion Mode: Able to clear  PRN suction with assistance   Secretion Amount: Moderate amount, thick and tan in color   Respiratory Exam:: Breath sounds: coarse crackles     Location: all lobes and bilaterally   Skin Exam:: Patient color: pink   Patient Status: Currently appears comfortable   Arterial Blood Gasses: pH Arterial (no units)   Date Value   09/01/2023 7.44     pO2 Arterial (mm Hg)   Date Value   09/01/2023 95     pCO2 Arterial (mm Hg)   Date Value   09/01/2023 37     Bicarbonate Arterial (mmol/L)   Date Value   09/01/2023 25                        Recorded by Shakira Aguila, RT

## 2023-09-04 NOTE — PROGRESS NOTES
.ORAL & MAXILLOFACIAL SURGERY   PROGRESS NOTE  Dari Hernandez  MRN: [id number],  : 1960           ASSESSMENT:  63 year old female s/p POD3 of an Incision and drainage of bilateral sublingual, submandibular, submental spaces and extraction of teeth #s 22 and 23 on 23. 7 penrose drains were placed. Moderate mixed-serous output from left drains and minimal drainage from right drains. Patient is presenting with persistent post-operative edema      PLAN:  No additional surgical interventions planned from OMFS perspective at this time.   Nurse to change fluffs on neck dressing once saturated. Expect moderate serosanguinous / purulent drainage from neck incision for a few days  Spoke with family as to why gauze needed to be kept in place.   Continue IV Unasyn  HOB >30   Mild oozing from dental extraction sites is expected, ok to place moist gauze with pressure to aid in hemostasis.   Culture pending   Heat to face, NO ICE  Ok for routine oral care  Ok for oral suction  All 7 penrose drains will likely be in place for at least another day,  pending clinical improvement  OK to extubate from an OMFS perspective.   OMFS to take over primary once patient no longer requires ICU level of care. Please page OMFS at that time.     Discussed with chief resident and staff.    Iesha Hebert DMD  Oral & Maxillofacial Surgery, Intern    Please contact the OMFS resident on-call with questions or concerns.  ____________________________________      SUBJECTIVE:  Patient continues to be intubated and sedated.       PHYSICAL EXAM:   Vitals:    23 0500 23 0600 23 0634 23 0800   BP: 126/75 (!) 140/78 (!) 140/78    BP Location:       Pulse: (!) 49 70 63    Resp: 21   Temp:    98.1  F (36.7  C)   TempSrc:    Axillary   SpO2: 98% 99% 99%    Weight:       Height:            General: Laying in bed, in no acute distress, intubated  HEENT: ETT, OGT in place,   Left sided erythema which was firm and warm, Left neck  incision with all 7 penrose drains were in place, with mild to moderate seropurulent drainage. Inferior border of the mandible is palpable on both the right and left. Little to no drainage on the right side. But moderate drainage on the left. Patient is tolerating secretions.   Intraoral: Left mandibular extraction sockets of #22 & 23 are hemostatic with sutures still in place. Healing as expected. Tongue and floor of the mouth still edematous, but softer compared to prior to surgery. Tongue with mild to moderate purulent sanguinous discharge. Generalized poor dentition.   Neuro: No acute distress, awakens to verbal stimulations, opens/squeezes eyes closed, follows commands to move all extremities  Pulm/Resp: Mechanically vented   CV: RRR  Incisions/Skin: Surgical site with 7 penrose drains, active drainage, covered with gauze and wrap with slight sanguinous staining, site clean, dry and intact.  MSK/Extremities: moving all extremities, peripheral pulses intact, calves soft, extremities warm and well perfuse    LABS:   CBC RESULTS:   Recent Labs   Lab Test 09/04/23  0550   WBC 11.2*   RBC 4.21   HGB 13.4   HCT 40.0   MCV 95   MCH 31.8   MCHC 33.5   RDW 15.1*         Last Comprehensive Metabolic Panel:  Sodium   Date Value Ref Range Status   09/04/2023 147 (H) 136 - 145 mmol/L Final     Potassium   Date Value Ref Range Status   09/04/2023 3.8 3.4 - 5.3 mmol/L Final     Chloride   Date Value Ref Range Status   09/04/2023 111 (H) 98 - 107 mmol/L Final     Carbon Dioxide (CO2)   Date Value Ref Range Status   09/04/2023 23 22 - 29 mmol/L Final     Anion Gap   Date Value Ref Range Status   09/04/2023 13 7 - 15 mmol/L Final     Glucose   Date Value Ref Range Status   09/04/2023 189 (H) 70 - 99 mg/dL Final     GLUCOSE BY METER POCT   Date Value Ref Range Status   09/04/2023 172 (H) 70 - 99 mg/dL Final     Urea Nitrogen   Date Value Ref Range Status   09/04/2023 29.7 (H) 8.0 - 23.0 mg/dL Final     Creatinine   Date  Value Ref Range Status   09/04/2023 0.68 0.51 - 0.95 mg/dL Final     GFR Estimate   Date Value Ref Range Status   09/04/2023 >90 >60 mL/min/1.73m2 Final     GFR, ESTIMATED POCT   Date Value Ref Range Status   08/31/2023 >60 >60 mL/min/1.73m2 Final     Calcium   Date Value Ref Range Status   09/04/2023 8.3 (L) 8.8 - 10.2 mg/dL Final        RADIOLOGY:  Narrative & Impression   XR CHEST PORT 1 VIEW  9/1/2023 1:59 AM      HISTORY:  ETT retracted        COMPARISON:  8/31/2023     FINDINGS:   Frontal view of the chest. Patient is rotated. Interval retraction of  the endotracheal tube with tip projecting 2.7 cm above the brissa.  Partially visualized enteric tube.      Stable enlarged cardiac silhouette. Decreased left  basilar/retrocardiac opacities. Continued blunting of the left  costophrenic angle. No appreciable pneumothorax.                                                                      IMPRESSION:   1. Interval retraction of the endotracheal tube with tip projecting  2.7 cm above the brissa.  2. Improved left effusion and left lower lobe atelectasis.     I have personally reviewed the examination and initial interpretation  and I agree with the findings.     DANTE WALKER MD

## 2023-09-04 NOTE — PLAN OF CARE
Arrived from: 4A 1740  Belongings/meds: belongings with patient  2 RN Skin Assessment Completed by: DALLAS Thompson & DALLAS Palomino  Non-intact findings documented (yes/no/NA): x7 NICKOLAS drains to neck/mouth, bilateral incisions    Admitted from OSH for tooth extraction and abscess, intubated d/t angioedema and not protecting airway. Extubated 9/4 and transferred to . Pt reports sore throat and hoarse voice, but reports that it is improving. VSS on RA. Hmong speaking, A&Ox4. Denies N/T, 5/5 throughout. PIV TKO. Mechanical soft diet, declined dinner (family is bringing food), SBA. Swelling to left neck.

## 2023-09-05 LAB
ANION GAP SERPL CALCULATED.3IONS-SCNC: 11 MMOL/L (ref 7–15)
BACTERIA BLD CULT: NO GROWTH
BACTERIA BLD CULT: NO GROWTH
BLAIMP ISLT/SPM QL: NOT DETECTED
BLAKPC ISLT/SPM QL: NOT DETECTED
BLAOXA-48 ISLT/SPM QL: NOT DETECTED
BLAVIM ISLT/SPM QL: NOT DETECTED
BUN SERPL-MCNC: 24.7 MG/DL (ref 8–23)
CALCIUM SERPL-MCNC: 8.4 MG/DL (ref 8.8–10.2)
CHLORIDE SERPL-SCNC: 108 MMOL/L (ref 98–107)
CREAT SERPL-MCNC: 0.62 MG/DL (ref 0.51–0.95)
DEPRECATED HCO3 PLAS-SCNC: 24 MMOL/L (ref 22–29)
ERYTHROCYTE [DISTWIDTH] IN BLOOD BY AUTOMATED COUNT: 14.6 % (ref 10–15)
GFR SERPL CREATININE-BSD FRML MDRD: >90 ML/MIN/1.73M2
GLUCOSE BLDC GLUCOMTR-MCNC: 107 MG/DL (ref 70–99)
GLUCOSE BLDC GLUCOMTR-MCNC: 120 MG/DL (ref 70–99)
GLUCOSE BLDC GLUCOMTR-MCNC: 123 MG/DL (ref 70–99)
GLUCOSE BLDC GLUCOMTR-MCNC: 130 MG/DL (ref 70–99)
GLUCOSE BLDC GLUCOMTR-MCNC: 134 MG/DL (ref 70–99)
GLUCOSE SERPL-MCNC: 140 MG/DL (ref 70–99)
HCT VFR BLD AUTO: 41.4 % (ref 35–47)
HGB BLD-MCNC: 13.7 G/DL (ref 11.7–15.7)
MAGNESIUM SERPL-MCNC: 2.1 MG/DL (ref 1.7–2.3)
MCH RBC QN AUTO: 31.8 PG (ref 26.5–33)
MCHC RBC AUTO-ENTMCNC: 33.1 G/DL (ref 31.5–36.5)
MCV RBC AUTO: 96 FL (ref 78–100)
NDM TARGET DNA: NOT DETECTED
PHOSPHATE SERPL-MCNC: 3.1 MG/DL (ref 2.5–4.5)
PLATELET # BLD AUTO: 168 10E3/UL (ref 150–450)
POTASSIUM SERPL-SCNC: 3.6 MMOL/L (ref 3.4–5.3)
RBC # BLD AUTO: 4.31 10E6/UL (ref 3.8–5.2)
SODIUM SERPL-SCNC: 143 MMOL/L (ref 136–145)
WBC # BLD AUTO: 11.6 10E3/UL (ref 4–11)

## 2023-09-05 PROCEDURE — 36415 COLL VENOUS BLD VENIPUNCTURE: CPT

## 2023-09-05 PROCEDURE — 250N000011 HC RX IP 250 OP 636: Mod: JZ

## 2023-09-05 PROCEDURE — 250N000011 HC RX IP 250 OP 636: Performed by: INTERNAL MEDICINE

## 2023-09-05 PROCEDURE — 84100 ASSAY OF PHOSPHORUS: CPT

## 2023-09-05 PROCEDURE — 87798 DETECT AGENT NOS DNA AMP: CPT | Performed by: INTERNAL MEDICINE

## 2023-09-05 PROCEDURE — 120N000002 HC R&B MED SURG/OB UMMC

## 2023-09-05 PROCEDURE — 80048 BASIC METABOLIC PNL TOTAL CA: CPT

## 2023-09-05 PROCEDURE — 250N000013 HC RX MED GY IP 250 OP 250 PS 637: Performed by: DENTIST

## 2023-09-05 PROCEDURE — 250N000013 HC RX MED GY IP 250 OP 250 PS 637

## 2023-09-05 PROCEDURE — 85027 COMPLETE CBC AUTOMATED: CPT

## 2023-09-05 PROCEDURE — 83735 ASSAY OF MAGNESIUM: CPT

## 2023-09-05 RX ORDER — POTASSIUM CHLORIDE 750 MG/1
20 TABLET, EXTENDED RELEASE ORAL ONCE
Status: COMPLETED | OUTPATIENT
Start: 2023-09-05 | End: 2023-09-05

## 2023-09-05 RX ADMIN — ACETAMINOPHEN 975 MG: 325 TABLET, FILM COATED ORAL at 17:46

## 2023-09-05 RX ADMIN — AMPICILLIN SODIUM AND SULBACTAM SODIUM 3 G: 2; 1 INJECTION, POWDER, FOR SOLUTION INTRAMUSCULAR; INTRAVENOUS at 11:07

## 2023-09-05 RX ADMIN — ACETAMINOPHEN 975 MG: 325 TABLET, FILM COATED ORAL at 09:18

## 2023-09-05 RX ADMIN — Medication 15 ML: at 09:18

## 2023-09-05 RX ADMIN — AMPICILLIN SODIUM AND SULBACTAM SODIUM 3 G: 2; 1 INJECTION, POWDER, FOR SOLUTION INTRAMUSCULAR; INTRAVENOUS at 22:10

## 2023-09-05 RX ADMIN — POTASSIUM CHLORIDE 20 MEQ: 750 TABLET, EXTENDED RELEASE ORAL at 09:17

## 2023-09-05 RX ADMIN — AMPICILLIN SODIUM AND SULBACTAM SODIUM 3 G: 2; 1 INJECTION, POWDER, FOR SOLUTION INTRAMUSCULAR; INTRAVENOUS at 04:42

## 2023-09-05 RX ADMIN — ENOXAPARIN SODIUM 40 MG: 40 INJECTION SUBCUTANEOUS at 09:18

## 2023-09-05 RX ADMIN — AMPICILLIN SODIUM AND SULBACTAM SODIUM 3 G: 2; 1 INJECTION, POWDER, FOR SOLUTION INTRAMUSCULAR; INTRAVENOUS at 16:50

## 2023-09-05 ASSESSMENT — ACTIVITIES OF DAILY LIVING (ADL)
ADLS_ACUITY_SCORE: 30
ADLS_ACUITY_SCORE: 27
ADLS_ACUITY_SCORE: 30
ADLS_ACUITY_SCORE: 30
ADLS_ACUITY_SCORE: 27
ADLS_ACUITY_SCORE: 27
ADLS_ACUITY_SCORE: 30
ADLS_ACUITY_SCORE: 27
ADLS_ACUITY_SCORE: 27
ADLS_ACUITY_SCORE: 30
ADLS_ACUITY_SCORE: 27
ADLS_ACUITY_SCORE: 30

## 2023-09-05 NOTE — PLAN OF CARE
Status: Pt admitted from OSH on 8/31 for tooth extraction and abscess, intubated d/t angioedema and not protecting airway, extubated on 9/4.  Vitals: VSS on RA.  Neuros: A&O x4. N/T to fingers and toes. Strengths 5/5 with generalized weakness. Hmong-speaking.   IV: PIV TKO between antibiotics.   Labs/Electrolytes: K replaced, redraw in AM.  Resp/trach: LS coarse/dim.   Diet: Regular. Family brings food and has been educated on importance of bringing soft foods.   Bowel status: BS+. LBM this morning.   : Voids spontaneously without difficulty.   Skin: Guaze on neck to be changed twice daily now that drains have been removed. Changed by RN this AM and by team this afternoon, team would like it changed once more before bed. Edema to L neck.   Pain: Denies. Scheduled Tylenol given.   Activity: Standby assist. Steady with IV pole.   Social:  at bedside, helpful and supportive of pt.   Plan: Monitor output from previous drain sites. Hot compresses as pt allows to neck.   Updates this shift: All penrose drains removed this shift.      Goal Outcome Evaluation:    Plan of Care Reviewed With: patient, spouse  Overall Patient Progress: improving  Outcome Evaluation: Drains removed. Pain well controlled.

## 2023-09-05 NOTE — PLAN OF CARE
Status: S/p I&D of bilateral sublingual, submandibular, submental spaces, extraction of teeth 22 and 23 9/1  Vitals: VSS on RA  Neuros: A&Ox4. Denies N/T, hands just feel swollen. Hmong speaking  IV: PIV SL between abx (per pt preference)  Labs/Electrolytes: K redraw in AM  Diet: Mechanical soft, family brought in food. Good PO  Bowel status: LBM today   : Voiding  Skin: All drains removed this AM - Old penrose drain sites on neck covered with gauze, changed at 2100. Old gauze with moderate amount of tan/bloody drainage   Pain: Denies, taking scheduled tylenol  Activity: Steady. OK to be up independent with  here  Social:  at bedside, helpful/supportive  Plan/updates: Pt eager to go home, walking halls and eating/drinking well. Per OMFS MD, possible discharge tmw. Continue POC

## 2023-09-05 NOTE — PROGRESS NOTES
As drainage through the existing drains is no longer productive, all drains (6 extra oral drains and 1 intraoral drain) were removed today at 1:30 pm. Drains were removed without complications. Extra oral wounds were irrigated thoroughly with saline. No retained silk suture was noted after removal of drains. New gauze dressing was placed and secured with paper tape.     RN was present at bedside during the entirety of the procedure.     Please feel free to contact OMFS on call for any questions or concerns.     Mercedes Aguilar DDS  OMFS intern   Pager: [phone]

## 2023-09-05 NOTE — PLAN OF CARE
Status: Admitted 8/31 from OSH for tooth extraction and abscess, intubated d/t angioedema and not protecting airway, extubated 9/4  Vitals: VSS on RA  Neuros: Hmong speaking, minimal english, A&O x4, strengths 5/5, complained of intermittent N/T to fingers but described it as falling asleep  IV: PIV is TKO in between IV abx  Labs/Electrolytes: WDL  Resp/trach: Lung sounds are coarse  Diet: Mech/Dental soft  Bowel status: Bowel sounds are active, BM 9/4  : Voiding spontanoeusly  Skin: Pt has x7 penrose drains x1 to the mouth, x6 upper neck. Gauze changed about every 4 hours or small amount of serosanguinous drainage. Edema to L neck  Pain: Denied pain, refused sched Tylenol  Activity: SBA w/ GB  Social: Pt was calm and cooperative,  has been helpful in cares  Plan: Will continue to monitor and follow POC  Updates this shift: Pt seemed to rest well in between cares overnight

## 2023-09-05 NOTE — ANESTHESIA POSTPROCEDURE EVALUATION
Patient: Dari Hernandez    Procedure: Procedure(s):  Incision and drainage neck, Extraction of Teeth #s 22 and 23       Anesthesia Type:  No value filed.    Note:  Disposition: Admission   Postop Pain Control: Uneventful            Sign Out: Well controlled pain   PONV:    Neuro/Psych: Uneventful            Sign Out: Acceptable/Baseline neuro status   Airway/Respiratory: Uneventful            Sign Out: Acceptable/Baseline resp. status   CV/Hemodynamics: Uneventful            Sign Out: Acceptable CV status; No obvious hypovolemia; No obvious fluid overload   Other NRE: NONE   DID A NON-ROUTINE EVENT OCCUR? No    Event details/Postop Comments:  No complications.           Last vitals:  Vitals:    09/05/23 0600 09/05/23 0719 09/05/23 1515   BP:  128/85 (!) 133/92   Pulse:  56    Resp:  16    Temp:  37.2  C (99  F)    SpO2: 99% 100% 99%       Electronically Signed By: Deepak Abdul MD  September 5, 2023  4:42 PM

## 2023-09-06 VITALS
OXYGEN SATURATION: 100 % | HEART RATE: 62 BPM | WEIGHT: 161.38 LBS | SYSTOLIC BLOOD PRESSURE: 126 MMHG | TEMPERATURE: 99 F | BODY MASS INDEX: 29.7 KG/M2 | HEIGHT: 62 IN | DIASTOLIC BLOOD PRESSURE: 79 MMHG | RESPIRATION RATE: 16 BRPM

## 2023-09-06 LAB
ANION GAP SERPL CALCULATED.3IONS-SCNC: 11 MMOL/L (ref 7–15)
BUN SERPL-MCNC: 17 MG/DL (ref 8–23)
CALCIUM SERPL-MCNC: 7.9 MG/DL (ref 8.8–10.2)
CHLORIDE SERPL-SCNC: 108 MMOL/L (ref 98–107)
CREAT SERPL-MCNC: 0.54 MG/DL (ref 0.51–0.95)
DEPRECATED HCO3 PLAS-SCNC: 23 MMOL/L (ref 22–29)
ERYTHROCYTE [DISTWIDTH] IN BLOOD BY AUTOMATED COUNT: 14.5 % (ref 10–15)
GFR SERPL CREATININE-BSD FRML MDRD: >90 ML/MIN/1.73M2
GLUCOSE BLDC GLUCOMTR-MCNC: 100 MG/DL (ref 70–99)
GLUCOSE BLDC GLUCOMTR-MCNC: 119 MG/DL (ref 70–99)
GLUCOSE BLDC GLUCOMTR-MCNC: 123 MG/DL (ref 70–99)
GLUCOSE BLDC GLUCOMTR-MCNC: 124 MG/DL (ref 70–99)
GLUCOSE SERPL-MCNC: 162 MG/DL (ref 70–99)
HCT VFR BLD AUTO: 40.1 % (ref 35–47)
HGB BLD-MCNC: 13.2 G/DL (ref 11.7–15.7)
MAGNESIUM SERPL-MCNC: 2.1 MG/DL (ref 1.7–2.3)
MCH RBC QN AUTO: 31.8 PG (ref 26.5–33)
MCHC RBC AUTO-ENTMCNC: 32.9 G/DL (ref 31.5–36.5)
MCV RBC AUTO: 97 FL (ref 78–100)
PHOSPHATE SERPL-MCNC: 2.3 MG/DL (ref 2.5–4.5)
PLATELET # BLD AUTO: 162 10E3/UL (ref 150–450)
POTASSIUM SERPL-SCNC: 3.6 MMOL/L (ref 3.4–5.3)
POTASSIUM SERPL-SCNC: 3.6 MMOL/L (ref 3.4–5.3)
RBC # BLD AUTO: 4.15 10E6/UL (ref 3.8–5.2)
SODIUM SERPL-SCNC: 142 MMOL/L (ref 136–145)
WBC # BLD AUTO: 7.9 10E3/UL (ref 4–11)

## 2023-09-06 PROCEDURE — 83735 ASSAY OF MAGNESIUM: CPT

## 2023-09-06 PROCEDURE — 250N000011 HC RX IP 250 OP 636: Mod: JZ

## 2023-09-06 PROCEDURE — 80048 BASIC METABOLIC PNL TOTAL CA: CPT

## 2023-09-06 PROCEDURE — 36415 COLL VENOUS BLD VENIPUNCTURE: CPT

## 2023-09-06 PROCEDURE — 84100 ASSAY OF PHOSPHORUS: CPT

## 2023-09-06 PROCEDURE — 85027 COMPLETE CBC AUTOMATED: CPT

## 2023-09-06 PROCEDURE — 250N000011 HC RX IP 250 OP 636: Performed by: INTERNAL MEDICINE

## 2023-09-06 PROCEDURE — 250N000013 HC RX MED GY IP 250 OP 250 PS 637

## 2023-09-06 RX ORDER — IBUPROFEN 600 MG/1
600 TABLET, FILM COATED ORAL EVERY 6 HOURS PRN
Qty: 28 TABLET | Refills: 0 | Status: SHIPPED | OUTPATIENT
Start: 2023-09-06 | End: 2023-09-13

## 2023-09-06 RX ORDER — OXYCODONE HYDROCHLORIDE 5 MG/1
5 TABLET ORAL EVERY 6 HOURS PRN
Qty: 12 TABLET | Refills: 0 | Status: SHIPPED | OUTPATIENT
Start: 2023-09-06 | End: 2023-09-09

## 2023-09-06 RX ORDER — CHLORHEXIDINE GLUCONATE ORAL RINSE 1.2 MG/ML
15 SOLUTION DENTAL 2 TIMES DAILY
Qty: 473 ML | Refills: 0 | Status: SHIPPED | OUTPATIENT
Start: 2023-09-06

## 2023-09-06 RX ORDER — ACETAMINOPHEN 500 MG
500-1000 TABLET ORAL EVERY 6 HOURS PRN
Qty: 28 TABLET | Refills: 0 | Status: SHIPPED | OUTPATIENT
Start: 2023-09-06 | End: 2023-09-13

## 2023-09-06 RX ADMIN — ENOXAPARIN SODIUM 40 MG: 40 INJECTION SUBCUTANEOUS at 08:49

## 2023-09-06 RX ADMIN — AMPICILLIN SODIUM AND SULBACTAM SODIUM 3 G: 2; 1 INJECTION, POWDER, FOR SOLUTION INTRAMUSCULAR; INTRAVENOUS at 05:35

## 2023-09-06 RX ADMIN — Medication 15 ML: at 08:49

## 2023-09-06 ASSESSMENT — ACTIVITIES OF DAILY LIVING (ADL)
ADLS_ACUITY_SCORE: 27
ADLS_ACUITY_SCORE: 25
ADLS_ACUITY_SCORE: 27

## 2023-09-06 NOTE — DISCHARGE INSTRUCTIONS
Return to clinic in 2-4 days, our clinic will call to coordinate appointment    HOME CARE INSTRUCTIONS FOLLOWING SURGERY    After surgery is over, you will be taken to the recovery room.  While in recovery your progress will be monitored closely by specially trained nurses.  You will remain in the recovery room until you are sufficiently awake, usually two (2) hours. While you are in the recovery room, your immediate family will not be allowed in for the privacy of other patients in the recovery room. After the recovery room stay, you will be transferred to your room or occasionally to a special care unit. Your family and friends will be informed of your room number and will allowed to meet you there.     VISITORS   It has been our experience that the evening after surgery was a time when visitors, other than family, are best kept to a minimum. It is encouraged that you have very few visitors and they be limited to the immediate members of the family and/or very close friends.     WOUNDS  The wounds in your mouth and skin should be left alone and undisturbed for the first 24 hours after surgery. Do not rinse your mouth or use a mouthwash for 1 days following surgery. Avoid probing the wound. Trauma to incisions can result loosening of sutures and opening of wounds. If you smoke please refrain for as long as possible, up to a week (or forever) is beneficial to healing.      NAUSEA AND VOMITING   You may experience some nausea or vomiting after surgery.      SWELLING  Swelling occurs after all surgery.  The degree of swelling is quite variable in different individuals.  More swelling usually occurs with the lower jaw surgery.  Swelling will continue to increase for approximately 48 to 72 hours following surgery, and then will resolve within 10 days to 2 weeks. We try to minimize your swelling with a medication but some swelling should be anticipated. You can reduce swelling by keeping your head elevated for the first  week following surgery and by walking as soon as possible after surgery.      FOLLOWING SURGERY  It is common to experience minor bleeding following surgery.  This generally stops at 24 to 48 hours but may continue for 7-10 days after surgery.Your left lip may be numb from the surgery and may stay that way for a few months. Most patients recover all sensation.      NASAL STUFFINESS AND SORE THROAT  A sore throat can occur, from the breathing tube placed during surgery by the anesthesiologist. Your sore throat should improve about 3-5 days following surgery. If your throat is still bothering you more than a week after surgery please make us aware of this.     SOFT DIET  It will be important that you drink a sufficient volume of fluids following surgery. An average adult requires 2 to 3 quarts of fluid every 24 hours.  While this may seem like a large quantity, it can be best achieved with constant sipping.  You may advance your diet as tolerated.      CLEANING THE TEETH   Continue to brush your teeth and keep up routine oral hygiene.      POSTOPERATIVE PAIN  Pain must be anticipated.  In most instances, however, it is moderate and treated with medications. Every patient experiences pain differently and there is no one size fits all approach. Your surgical team will attempt to tailor your medications to best control your pain. Generally, no injections are needed for pain and a liquid or pill form medication is all that is required.    MEDICATIONS  During the period of hospitalizations, you will usually be given antibiotics, ointment to keep your lips moist, and a pain medication if needed.  Most often these will be discontinued on discharge from the hospital, except for some of the medications, which will be used for 3 days to 2 weeks following surgery. Antibiotic and antifungal will be prescribe, please follow the directions for these medications closely.     Common medications:  Hydrocodone/Oxycodone - Narcotic pain  control  Acetominophen/Ibuprofen - Non-narcotic pain control  Amoxicillin/Clindamycin - Antibiotic, typically a 5-7 day course  Ondansetron/Phenergan/Compazine/Scopolamine - Anti-nausea medication  Guaufenesin - Mucolytic, used to keep mucous from hardening in nasal passages after upper jaw surgery  Sudafed/Claritin - Decongestant, used to keep sinuses clear after upper jaw surgery  Colace/Senna - Stool softener used while taking Narcotic pain medication      DAY OF DISCHARGE  You will be encouraged to resume your normal activities as soon as possible. The drain will stay in place until your follow up appointment, dressing can be kept over the area to catch any drainage.    AFTER HOURS CONTACT FOR EMERGENCIES:  Please call the [facility] switchboard at [phone] and ask to have the Oral and Maxillofacial Surgery Resident On-call paged.     It is our desire to make your recovery as smooth as possible. These instructions are given to assist you following your surgery. If you have any questions please call the clinic at [phone].

## 2023-09-06 NOTE — PROGRESS NOTES
Discharge time/date: 1150 9/6/23  Walked or Wheelchair: Walked  PIV removed: Yes  Reviewed AVS with patient: Yes, via   Medication due times added to AVS in EPIC: N/a, written down with interpretter  Verbalized understanding of discharge with teachback: Yes  Medications retrieved from pharmacy: Yes  Supplies sent home: Yes, gauze, tape, saline  Belongings from security with patient: N/A

## 2023-09-06 NOTE — DISCHARGE SUMMARY
[facility]    Oral & Maxillofacial Surgery - Discharge Summary    Date of Admission:  8/31/2023  Date of Discharge:  9/6/2023 11:55 AM  Discharging Attending Provider: Hemanth Vinson MD DDS  Discharge Service: Oral & Maxillofacial Surgery    Discharge Diagnoses   (L02.11) Abscess of neck  (primary encounter diagnosis)  Plan: Case Request: INCISION AND DRAINAGE, NECK,         amoxicillin-clavulanate (AUGMENTIN) 875-125 MG         tablet, acetaminophen (TYLENOL) 500 MG tablet,         ibuprofen (ADVIL/MOTRIN) 600 MG tablet,         oxyCODONE (ROXICODONE) 5 MG tablet,         chlorhexidine (PERIDEX) 0.12 % solution    Follow-ups Needed After Discharge   Follow up at the O[facility] clinic next week. Our staff will call to schedule.     Hospital Course   Dari Hernandez was admitted on 8/31/2023 for an incision and drainage bilateral submandibular, submental space abscesses and extraction of teeth #22,23 and biopsy of the left lateral border of tongue.  The surgery went well with no complications.     Consultations This Hospital Stay   ORAL MAXILLOFACIAL SURGERY ADULT IP CONSULT  NURSING TO CONSULT FOR VASCULAR ACCESS CARE IP CONSULT  NUTRITION SERVICES ADULT IP CONSULT  NURSING TO CONSULT FOR VASCULAR ACCESS CARE IP CONSULT  PHARMACY IP CONSULT  NURSING TO CONSULT FOR VASCULAR ACCESS CARE IP CONSULT  NURSING TO CONSULT FOR VASCULAR ACCESS CARE IP CONSULT    Code Status   Full Code       The patient was discussed with upper resident Dr. Gerardo Aguilar, [facility]  Oral & Maxillofacial Surgery, intern  Pager: [phone]  ______________________________________________________________________    Physical Exam   Vital Signs: Temp: 99  F (37.2  C) Temp src: Oral BP: 126/79 Pulse: 62   Resp: 16 SpO2: 100 % O2 Device: None (Room air)    Weight: 161 lbs 6.03 oz    GEN: WD/WN female, NAD  HEENT: NC/AT, EOMI, PERRL, mild facial edema c/w postoperative healing, no induration, surgical sites are  clean with presence of clean gauze dressing, not tender on palpation   I/O: left lateral border of tongue with ulceration not tender on palpation  CV: RRR, no M/G/R  PULM: CTAB, breathing comfortably on room air  GI: Soft, ND/NT  MSK: ROTH, no peripheral extremity edema  NEURO: AAOx4, CN II-XII intact bilaterally  PSYCH: Appropriate mood and affect     Significant Results and Procedures   Most Recent 3 CBC's:  Recent Labs   Lab Test 09/06/23  0606 09/05/23  0642 09/04/23  0550   WBC 7.9 11.6* 11.2*   HGB 13.2 13.7 13.4   MCV 97 96 95    168 168       Pending Results     Unresulted Labs Ordered in the Past 30 Days of this Admission       Date and Time Order Name Status Description    9/1/2023  2:27 PM Surgical Pathology Exam In process                Primary Care Physician   FITZ RIVERA    Discharge Disposition   Discharged to home  Condition at discharge: Stable    Discharge Orders      Follow Up ([facility])    Follow up with the [facility] clinic next week. Our clinic staff will call to schedule.     Oral and Maxillofacial Surgery (OMFS) Clinic  [facility]  [address] [address]  Clinic phone number: [phone]  Clinic fax number: [phone]     Reason for your hospital stay    You were admitted due for intensive medical care due to severe infection impacting your airway. You were taken to the OR for open incision and drainage of bilateral neck and your tongue. Biopsies were taken and we will follow you outpatient for biopsy results.     Activity    Your activity upon discharge: no lifting, driving, or strenuous exercise for 2 weeks.     Follow Up ([facility])    Follow up will take place 9/11/23 at 8:30 AM in [address], [address] [place] [address] clinic.     Diet    Follow this diet upon discharge: Soft foot diet. Progress as tolerated.     Discharge Medications   Discharge Medication List as of 9/6/2023 10:19 AM        START  taking these medications    Details   !! acetaminophen (TYLENOL) 500 MG tablet Take 1-2 tablets (500-1,000 mg) by mouth every 6 hours as needed for mild pain, Disp-28 tablet, R-0, E-Prescribe      amoxicillin-clavulanate (AUGMENTIN) 875-125 MG tablet Take 1 tablet by mouth 2 times daily for 7 days, Disp-14 tablet, R-0, E-Prescribe      chlorhexidine (PERIDEX) 0.12 % solution Swish and spit 15 mLs in mouth 2 times daily, Disp-473 mL, R-0, E-Prescribe      ibuprofen (ADVIL/MOTRIN) 600 MG tablet Take 1 tablet (600 mg) by mouth every 6 hours as needed for moderate pain, Disp-28 tablet, R-0, E-Prescribe      oxyCODONE (ROXICODONE) 5 MG tablet Take 1 tablet (5 mg) by mouth every 6 hours as needed for pain, Disp-12 tablet, R-0, E-Prescribe       !! - Potential duplicate medications found. Please discuss with provider.        CONTINUE these medications which have NOT CHANGED    Details   !! acetaminophen (TYLENOL EXTRA STRENGTH) 500 MG tablet [ACETAMINOPHEN (TYLENOL EXTRA STRENGTH) 500 MG TABLET] Take 2 tablets (1,000 mg total) by mouth 3 (three) times a day as needed for pain., Disp-100 tablet, R-0, Normal       !! - Potential duplicate medications found. Please discuss with provider.        Allergies   No Known Allergies

## 2023-09-06 NOTE — PLAN OF CARE
Status: Admitted 8/31 from OSH for tooth extraction and abscess, intubated d/t angioedema and not protecting airway, extubated 9/4  Vitals: VSS on RA  Neuros: Hmong speaking, minimal english, A&O x4, strengths 5/5, denied N/T  IV: PIV is SL in between IV abx  Labs/Electrolytes: WDL  Resp/trach: Lung sounds are coarse  Diet: Mech/Dental soft  Bowel status: Bowel sounds are active, BM 9/5  : Voiding spontanoeusly  Skin: [place]rose drains are out, covered w/ gauze taped onto her chin, gauze not changed overnight, order to change  gauze BID. Edema to L neck  Pain: Denied pain, refused sched Tylenol  Activity: Independent w/   Social: Pt was calm and cooperative,  has been helpful in cares  Plan: Will continue to monitor and follow POC, possible discharge today. Per OMFS pt needs to demonstrate good PO and ambulating frequently, pending home abx plan  Updates this shift: Pt seemed to rest well in between cares overnight

## 2023-09-07 LAB
PATH REPORT.COMMENTS IMP SPEC: NORMAL
PATH REPORT.COMMENTS IMP SPEC: NORMAL
PATH REPORT.FINAL DX SPEC: NORMAL
PATH REPORT.GROSS SPEC: NORMAL
PATH REPORT.MICROSCOPIC SPEC OTHER STN: NORMAL
PATH REPORT.RELEVANT HX SPEC: NORMAL
PHOTO IMAGE: NORMAL

## (undated) DEVICE — BLADE KNIFE SURG 15 371115

## (undated) DEVICE — GLOVE BIOGEL PI ULTRATOUCH G SZ 7.5 42175

## (undated) DEVICE — CAUTERY BLADE NEEDLE 1IN COATED E1452

## (undated) DEVICE — SU SILK 2-0 SH 30" K833H

## (undated) DEVICE — PREP CHLORAPREP 26ML TINTED HI-LITE ORANGE 930815

## (undated) DEVICE — DRAPE U SPLIT 74X120" 29440

## (undated) DEVICE — PACK NEURO MINOR UMMC SNE32MNMU4

## (undated) DEVICE — SUTURE SILK 3-0 TIES 30IN SA84H

## (undated) DEVICE — SYR 10ML LL W/O NDL 302995

## (undated) DEVICE — ESU PENCIL SMOKE EVAC W/ROCKER SWITCH 0703-047-000

## (undated) DEVICE — SU SILK 2-0 FS-1 18" 685G

## (undated) DEVICE — SUCTION MANIFOLD NEPTUNE 2 SYS 4 PORT 0702-020-000

## (undated) DEVICE — NDL 25GA 1.5" 305127

## (undated) DEVICE — SU CHROMIC 3-0 SH 27" G122H

## (undated) DEVICE — SOL WATER IRRIG 1000ML BOTTLE 2F7114

## (undated) DEVICE — SU SILK 3-0 SH 30" K832H

## (undated) DEVICE — CUSTOM PACK GEN MAJOR SBA5BGMHEA

## (undated) DEVICE — TEST TUBE W/SCREW CAP 17361

## (undated) DEVICE — Device

## (undated) DEVICE — TUBE CULTURE AEROBIC/ANAEROBIC W/O SWABS A.C.T.I.1. 12401

## (undated) DEVICE — ESU GROUND PAD ADULT W/CORD E7507

## (undated) DEVICE — LINEN TOWEL PACK X5 5464

## (undated) DEVICE — SU SILK 2-0 SH CR 8X18" C012D

## (undated) DEVICE — SOL NACL 0.9% IRRIG 1000ML BOTTLE 2F7124

## (undated) RX ORDER — CHLORHEXIDINE GLUCONATE ORAL RINSE 1.2 MG/ML
SOLUTION DENTAL
Status: DISPENSED
Start: 2023-09-01

## (undated) RX ORDER — PROPOFOL 10 MG/ML
INJECTION, EMULSION INTRAVENOUS
Status: DISPENSED
Start: 2023-08-31

## (undated) RX ORDER — FENTANYL CITRATE 50 UG/ML
INJECTION, SOLUTION INTRAMUSCULAR; INTRAVENOUS
Status: DISPENSED
Start: 2023-09-01

## (undated) RX ORDER — HYDROMORPHONE HYDROCHLORIDE 1 MG/ML
INJECTION, SOLUTION INTRAMUSCULAR; INTRAVENOUS; SUBCUTANEOUS
Status: DISPENSED
Start: 2023-09-01

## (undated) RX ORDER — BUPIVACAINE HYDROCHLORIDE AND EPINEPHRINE 2.5; 5 MG/ML; UG/ML
INJECTION, SOLUTION EPIDURAL; INFILTRATION; INTRACAUDAL; PERINEURAL
Status: DISPENSED
Start: 2023-09-01

## (undated) RX ORDER — FENTANYL CITRATE-0.9 % NACL/PF 10 MCG/ML
PLASTIC BAG, INJECTION (ML) INTRAVENOUS
Status: DISPENSED
Start: 2023-08-31